# Patient Record
Sex: MALE | URBAN - METROPOLITAN AREA
[De-identification: names, ages, dates, MRNs, and addresses within clinical notes are randomized per-mention and may not be internally consistent; named-entity substitution may affect disease eponyms.]

---

## 2019-02-11 ENCOUNTER — APPOINTMENT (EMERGENCY)
Dept: RADIOLOGY | Facility: HOSPITAL | Age: 84
DRG: 854 | End: 2019-02-11
Payer: COMMERCIAL

## 2019-02-11 ENCOUNTER — HOSPITAL ENCOUNTER (INPATIENT)
Facility: HOSPITAL | Age: 84
LOS: 8 days | Discharge: NURSING, REHAB OR LONG-TERM CARE | DRG: 854 | End: 2019-02-19
Attending: EMERGENCY MEDICINE | Admitting: SURGERY
Payer: COMMERCIAL

## 2019-02-11 DIAGNOSIS — E80.6 HYPERBILIRUBINEMIA: ICD-10-CM

## 2019-02-11 DIAGNOSIS — R78.81 BACTEREMIA: ICD-10-CM

## 2019-02-11 DIAGNOSIS — K83.09 CHOLANGITIS: Primary | ICD-10-CM

## 2019-02-11 DIAGNOSIS — F02.80 DEMENTIA ASSOCIATED WITH OTHER UNDERLYING DISEASE WITHOUT BEHAVIORAL DISTURBANCE (HCC): ICD-10-CM

## 2019-02-11 DIAGNOSIS — B35.1 ONYCHOMYCOSIS: ICD-10-CM

## 2019-02-11 DIAGNOSIS — R10.11 RUQ PAIN: ICD-10-CM

## 2019-02-11 DIAGNOSIS — K81.9 CHOLECYSTITIS: ICD-10-CM

## 2019-02-11 DIAGNOSIS — R74.01 TRANSAMINITIS: ICD-10-CM

## 2019-02-11 DIAGNOSIS — R41.82 ALTERED MENTAL STATUS: ICD-10-CM

## 2019-02-11 LAB
ALBUMIN SERPL BCP-MCNC: 3 G/DL (ref 3.5–5)
ALP SERPL-CCNC: 212 U/L (ref 46–116)
ALT SERPL W P-5'-P-CCNC: 132 U/L (ref 12–78)
ANION GAP SERPL CALCULATED.3IONS-SCNC: 9 MMOL/L (ref 4–13)
ANISOCYTOSIS BLD QL SMEAR: PRESENT
APAP SERPL-MCNC: <2 UG/ML (ref 10–30)
APTT PPP: 29 SECONDS (ref 26–38)
AST SERPL W P-5'-P-CCNC: 128 U/L (ref 5–45)
ATRIAL RATE: 79 BPM
BACTERIA UR QL AUTO: ABNORMAL /HPF
BASOPHILS # BLD MANUAL: 0 THOUSAND/UL (ref 0–0.1)
BASOPHILS NFR MAR MANUAL: 0 % (ref 0–1)
BILIRUB SERPL-MCNC: 7.66 MG/DL (ref 0.2–1)
BILIRUB UR QL STRIP: ABNORMAL
BUN SERPL-MCNC: 39 MG/DL (ref 5–25)
CALCIUM SERPL-MCNC: 8.7 MG/DL (ref 8.3–10.1)
CHLORIDE SERPL-SCNC: 102 MMOL/L (ref 100–108)
CLARITY UR: ABNORMAL
CO2 SERPL-SCNC: 22 MMOL/L (ref 21–32)
COLOR UR: ABNORMAL
COLOR, POC: NORMAL
CREAT SERPL-MCNC: 1.17 MG/DL (ref 0.6–1.3)
EOSINOPHIL # BLD MANUAL: 0 THOUSAND/UL (ref 0–0.4)
EOSINOPHIL NFR BLD MANUAL: 0 % (ref 0–6)
ERYTHROCYTE [DISTWIDTH] IN BLOOD BY AUTOMATED COUNT: 16.1 % (ref 11.6–15.1)
GFR SERPL CREATININE-BSD FRML MDRD: 58 ML/MIN/1.73SQ M
GLUCOSE SERPL-MCNC: 118 MG/DL (ref 65–140)
GLUCOSE UR STRIP-MCNC: NEGATIVE MG/DL
HCT VFR BLD AUTO: 35.1 % (ref 36.5–49.3)
HGB BLD-MCNC: 12 G/DL (ref 12–17)
HGB UR QL STRIP.AUTO: ABNORMAL
HYALINE CASTS #/AREA URNS LPF: ABNORMAL /LPF
INR PPP: 1.15 (ref 0.86–1.17)
KETONES UR STRIP-MCNC: ABNORMAL MG/DL
LACTATE SERPL-SCNC: 1.5 MMOL/L (ref 0.5–2)
LACTATE SERPL-SCNC: 3.1 MMOL/L (ref 0.5–2)
LEUKOCYTE ESTERASE UR QL STRIP: NEGATIVE
LIPASE SERPL-CCNC: 28 U/L (ref 73–393)
LYMPHOCYTES # BLD AUTO: 0.29 THOUSAND/UL (ref 0.6–4.47)
LYMPHOCYTES # BLD AUTO: 2 % (ref 14–44)
MCH RBC QN AUTO: 38.7 PG (ref 26.8–34.3)
MCHC RBC AUTO-ENTMCNC: 34.2 G/DL (ref 31.4–37.4)
MCV RBC AUTO: 113 FL (ref 82–98)
MONOCYTES # BLD AUTO: 0.57 THOUSAND/UL (ref 0–1.22)
MONOCYTES NFR BLD: 4 % (ref 4–12)
NEUTROPHILS # BLD MANUAL: 13.34 THOUSAND/UL (ref 1.85–7.62)
NEUTS BAND NFR BLD MANUAL: 2 % (ref 0–8)
NEUTS SEG NFR BLD AUTO: 91 % (ref 43–75)
NITRITE UR QL STRIP: NEGATIVE
NON-SQ EPI CELLS URNS QL MICRO: ABNORMAL /HPF
NRBC BLD AUTO-RTO: 0 /100 WBCS
P AXIS: 88 DEGREES
PH UR STRIP.AUTO: 5.5 [PH] (ref 4.5–8)
PLATELET # BLD AUTO: 156 THOUSANDS/UL (ref 149–390)
PLATELET BLD QL SMEAR: ABNORMAL
PMV BLD AUTO: 10.4 FL (ref 8.9–12.7)
POIKILOCYTOSIS BLD QL SMEAR: PRESENT
POLYCHROMASIA BLD QL SMEAR: PRESENT
POTASSIUM SERPL-SCNC: 5.2 MMOL/L (ref 3.5–5.3)
PR INTERVAL: 194 MS
PROT SERPL-MCNC: 7.3 G/DL (ref 6.4–8.2)
PROT UR STRIP-MCNC: ABNORMAL MG/DL
PROTHROMBIN TIME: 14.8 SECONDS (ref 11.8–14.2)
QRS AXIS: -65 DEGREES
QRSD INTERVAL: 102 MS
QT INTERVAL: 360 MS
QTC INTERVAL: 412 MS
RBC # BLD AUTO: 3.1 MILLION/UL (ref 3.88–5.62)
RBC #/AREA URNS AUTO: ABNORMAL /HPF
RBC MORPH BLD: PRESENT
SODIUM SERPL-SCNC: 133 MMOL/L (ref 136–145)
SP GR UR STRIP.AUTO: >=1.03 (ref 1–1.03)
T WAVE AXIS: 63 DEGREES
TROPONIN I SERPL-MCNC: <0.02 NG/ML
TSH SERPL DL<=0.05 MIU/L-ACNC: 0.92 UIU/ML (ref 0.36–3.74)
UROBILINOGEN UR QL STRIP.AUTO: 4 E.U./DL
VARIANT LYMPHS # BLD AUTO: 1 %
VENTRICULAR RATE: 79 BPM
WBC # BLD AUTO: 14.34 THOUSAND/UL (ref 4.31–10.16)
WBC #/AREA URNS AUTO: ABNORMAL /HPF

## 2019-02-11 PROCEDURE — 85610 PROTHROMBIN TIME: CPT | Performed by: EMERGENCY MEDICINE

## 2019-02-11 PROCEDURE — 36415 COLL VENOUS BLD VENIPUNCTURE: CPT | Performed by: EMERGENCY MEDICINE

## 2019-02-11 PROCEDURE — 85027 COMPLETE CBC AUTOMATED: CPT | Performed by: EMERGENCY MEDICINE

## 2019-02-11 PROCEDURE — 81002 URINALYSIS NONAUTO W/O SCOPE: CPT | Performed by: EMERGENCY MEDICINE

## 2019-02-11 PROCEDURE — 87040 BLOOD CULTURE FOR BACTERIA: CPT | Performed by: EMERGENCY MEDICINE

## 2019-02-11 PROCEDURE — 71046 X-RAY EXAM CHEST 2 VIEWS: CPT

## 2019-02-11 PROCEDURE — 99285 EMERGENCY DEPT VISIT HI MDM: CPT

## 2019-02-11 PROCEDURE — 85007 BL SMEAR W/DIFF WBC COUNT: CPT | Performed by: EMERGENCY MEDICINE

## 2019-02-11 PROCEDURE — 74177 CT ABD & PELVIS W/CONTRAST: CPT

## 2019-02-11 PROCEDURE — 93005 ELECTROCARDIOGRAM TRACING: CPT

## 2019-02-11 PROCEDURE — 81003 URINALYSIS AUTO W/O SCOPE: CPT

## 2019-02-11 PROCEDURE — 87077 CULTURE AEROBIC IDENTIFY: CPT | Performed by: EMERGENCY MEDICINE

## 2019-02-11 PROCEDURE — 99221 1ST HOSP IP/OBS SF/LOW 40: CPT | Performed by: SURGERY

## 2019-02-11 PROCEDURE — 70450 CT HEAD/BRAIN W/O DYE: CPT

## 2019-02-11 PROCEDURE — 87186 SC STD MICRODIL/AGAR DIL: CPT | Performed by: EMERGENCY MEDICINE

## 2019-02-11 PROCEDURE — 80053 COMPREHEN METABOLIC PANEL: CPT | Performed by: EMERGENCY MEDICINE

## 2019-02-11 PROCEDURE — 83690 ASSAY OF LIPASE: CPT | Performed by: EMERGENCY MEDICINE

## 2019-02-11 PROCEDURE — 76705 ECHO EXAM OF ABDOMEN: CPT

## 2019-02-11 PROCEDURE — 85730 THROMBOPLASTIN TIME PARTIAL: CPT | Performed by: EMERGENCY MEDICINE

## 2019-02-11 PROCEDURE — 81001 URINALYSIS AUTO W/SCOPE: CPT

## 2019-02-11 PROCEDURE — 83605 ASSAY OF LACTIC ACID: CPT | Performed by: EMERGENCY MEDICINE

## 2019-02-11 PROCEDURE — 84484 ASSAY OF TROPONIN QUANT: CPT | Performed by: EMERGENCY MEDICINE

## 2019-02-11 PROCEDURE — 84443 ASSAY THYROID STIM HORMONE: CPT | Performed by: EMERGENCY MEDICINE

## 2019-02-11 PROCEDURE — 80329 ANALGESICS NON-OPIOID 1 OR 2: CPT | Performed by: EMERGENCY MEDICINE

## 2019-02-11 PROCEDURE — 93010 ELECTROCARDIOGRAM REPORT: CPT | Performed by: INTERNAL MEDICINE

## 2019-02-11 PROCEDURE — 96365 THER/PROPH/DIAG IV INF INIT: CPT

## 2019-02-11 PROCEDURE — 87076 CULTURE ANAEROBE IDENT EACH: CPT | Performed by: EMERGENCY MEDICINE

## 2019-02-11 PROCEDURE — 96361 HYDRATE IV INFUSION ADD-ON: CPT

## 2019-02-11 RX ORDER — HEPARIN SODIUM 5000 [USP'U]/ML
5000 INJECTION, SOLUTION INTRAVENOUS; SUBCUTANEOUS EVERY 8 HOURS SCHEDULED
Status: DISCONTINUED | OUTPATIENT
Start: 2019-02-11 | End: 2019-02-19 | Stop reason: HOSPADM

## 2019-02-11 RX ORDER — CHLORHEXIDINE GLUCONATE 0.12 MG/ML
15 RINSE ORAL EVERY 12 HOURS SCHEDULED
Status: DISCONTINUED | OUTPATIENT
Start: 2019-02-11 | End: 2019-02-12

## 2019-02-11 RX ORDER — SODIUM CHLORIDE 9 MG/ML
100 INJECTION, SOLUTION INTRAVENOUS CONTINUOUS
Status: DISCONTINUED | OUTPATIENT
Start: 2019-02-11 | End: 2019-02-12

## 2019-02-11 RX ADMIN — IOHEXOL 100 ML: 350 INJECTION, SOLUTION INTRAVENOUS at 17:45

## 2019-02-11 RX ADMIN — PIPERACILLIN SODIUM,TAZOBACTAM SODIUM 3.38 G: 3; .375 INJECTION, POWDER, FOR SOLUTION INTRAVENOUS at 17:47

## 2019-02-11 RX ADMIN — SODIUM CHLORIDE 1000 ML: 0.9 INJECTION, SOLUTION INTRAVENOUS at 17:46

## 2019-02-11 RX ADMIN — SODIUM CHLORIDE 1000 ML: 0.9 INJECTION, SOLUTION INTRAVENOUS at 16:12

## 2019-02-11 NOTE — ED PROVIDER NOTES
History  Chief Complaint   Patient presents with    Altered Mental Status     pt comes from home, pt acknowledges his confusion  Patient has some medical care through [facility]  Last visit just over a month ago  Per the note the patient was poorly kept  They also note they were concerned the patient has dementia and were concerned about his living conditions  A 3rd party unknown to us called 911 states the patient's living conditions were unsanitary; police arrived apparently there were bed bugs in-house; the patient was removed from the house  Apparently patient has been declining pretty rapidly  The patient does live on his own although unsure how; because right now he is not oriented  The patient is oriented to himself and distant memories; however thinks it is 18 and is unaware of what month it is  He cannot truly tell me what he does on daily basis; besides goes to [employer]  He appears unkept he is he has stool in his underwear and shoes  Patient is very jaundiced he states people have been saying that he has been jaundiced lately  Patient has no focal findings with cranial nerves or motor exam   He denies any medical complaints  He has no obvious signs of trauma or abdominal discomfort  However patient does not look like he is taking care of himself  Patient is borderline febrile to 100 3° rectally  Patient also has a heart rate of 91 meeting SIRS criteria  Patient is jaundiced throughout in the sclerae and skin  Patient claims he has a brother  None       No past medical history on file  No past surgical history on file  No family history on file  I have reviewed and agree with the history as documented      Social History     Tobacco Use    Smoking status: Not on file   Substance Use Topics    Alcohol use: Not on file    Drug use: Not on file        Review of Systems   Unable to perform ROS: Mental status change (Patient denies all symptoms asked of him although unreliable)   Constitutional: Negative for activity change, appetite change, chills and fever  HENT: Negative for congestion, rhinorrhea and sore throat  Eyes: Negative for photophobia and pain  Respiratory: Negative for cough, chest tightness and wheezing  Cardiovascular: Negative for chest pain, palpitations and leg swelling  Gastrointestinal: Negative for abdominal distention, abdominal pain, constipation, diarrhea and nausea  Genitourinary: Negative for dysuria, flank pain, frequency and hematuria  Musculoskeletal: Negative for arthralgias, back pain, myalgias, neck pain and neck stiffness  Skin: Negative for color change and rash  Neurological: Negative for seizures, speech difficulty, weakness, light-headedness and headaches  Hematological: Negative for adenopathy  Psychiatric/Behavioral: Negative for agitation, confusion, hallucinations and suicidal ideas  Physical Exam  ED Triage Vitals [02/11/19 1559]   Temperature Pulse Respirations Blood Pressure SpO2   100 3 °F (37 9 °C) 91 20 140/74 97 %      Temp Source Heart Rate Source Patient Position - Orthostatic VS BP Location FiO2 (%)   Rectal Monitor Lying Right arm --      Pain Score       No Pain           Orthostatic Vital Signs  Vitals:    02/11/19 1559 02/11/19 1759 02/11/19 2030 02/11/19 2245   BP: 140/74 141/64 140/69 137/72   Pulse: 91 86 78 72   Patient Position - Orthostatic VS: Lying Lying         Physical Exam   Constitutional: He appears well-developed  No distress  Unkept  Stool in underwear  Stool on shoes  Long toenails   HENT:   Head: Normocephalic and atraumatic  Mouth/Throat: No oropharyngeal exudate  Eyes: Pupils are equal, round, and reactive to light  EOM are normal  Right eye exhibits no discharge  Left eye exhibits no discharge  Scleral icterus   Neck: Normal range of motion  Neck supple  No JVD present  No tracheal deviation present  Cardiovascular: Normal rate and normal heart sounds  No murmur heard  Pulmonary/Chest: Effort normal and breath sounds normal  No respiratory distress  He has no wheezes  He has no rales  Clear bilateral   Abdominal: Soft  Bowel sounds are normal  He exhibits no distension  There is tenderness  There is no rebound and no guarding  Very mild upper abdominal discomfort   Musculoskeletal: Normal range of motion  He exhibits no edema or deformity  No swelling upper or lower extremities   Lymphadenopathy:     He has no cervical adenopathy  Neurological: He is alert  He has normal strength  He is disoriented  No cranial nerve deficit  He exhibits normal muscle tone  Alert but not oriented  Cranial nerves intact  Motor 5/5 and symmetrical   Skin: Skin is warm and dry  Psychiatric: Thought content normal        ED Medications  Medications   sodium chloride 0 9 % infusion (has no administration in time range)   chlorhexidine (PERIDEX) 0 12 % oral rinse 15 mL (has no administration in time range)   heparin (porcine) subcutaneous injection 5,000 Units (has no administration in time range)   sodium chloride 0 9 % bolus 1,000 mL (0 mL Intravenous Stopped 2/11/19 1746)   piperacillin-tazobactam (ZOSYN) 3 375 g in sodium chloride 0 9 % 50 mL IVPB (0 g Intravenous Stopped 2/11/19 1820)   sodium chloride 0 9 % bolus 1,000 mL (0 mL Intravenous Stopped 2/11/19 2037)   iohexol (OMNIPAQUE) 350 MG/ML injection (MULTI-DOSE) 100 mL (100 mL Intravenous Given 2/11/19 1745)       Diagnostic Studies  Results Reviewed     Procedure Component Value Units Date/Time    Lactic acid, plasma [[id number]]  (Normal) Collected:  02/11/19 1910    Lab Status:  Final result Specimen:  Blood from Arm, Left Updated:  02/11/19 1957     LACTIC ACID 1 5 mmol/L     Narrative:       Result may be elevated if tourniquet was used during collection      Protime-INR [[id number]]  (Abnormal) Collected:  02/11/19 1753    Lab Status:  Final result Specimen:  Blood from Arm, Left Updated:  02/11/19 1832 Protime 14 8 seconds      INR 1 15    APTT [[id number]]  (Normal) Collected:  02/11/19 1753    Lab Status:  Final result Specimen:  Blood from Arm, Left Updated:  02/11/19 1832     PTT 29 seconds     Comprehensive metabolic panel [[id number]]  (Abnormal) Collected:  02/11/19 1604    Lab Status:  Final result Specimen:  Blood from Arm, Left Updated:  02/11/19 1711     Sodium 133 mmol/L      Potassium 5 2 mmol/L      Chloride 102 mmol/L      CO2 22 mmol/L      ANION GAP 9 mmol/L      BUN 39 mg/dL      Creatinine 1 17 mg/dL      Glucose 118 mg/dL      Calcium 8 7 mg/dL       U/L       U/L      Alkaline Phosphatase 212 U/L      Total Protein 7 3 g/dL      Albumin 3 0 g/dL      Total Bilirubin 7 66 mg/dL      eGFR 58 ml/min/1 73sq m     Narrative:       National Kidney Disease Education Program recommendations are as follows:  GFR calculation is accurate only with a steady state creatinine  Chronic Kidney disease less than 60 ml/min/1 73 sq  meters  Kidney failure less than 15 ml/min/1 73 sq  meters  Lipase [[id number]]  (Abnormal) Collected:  02/11/19 1604    Lab Status:  Final result Specimen:  Blood from Arm, Left Updated:  02/11/19 1711     Lipase 28 u/L     TSH, 3rd generation with Free T4 reflex [[id number]]  (Normal) Collected:  02/11/19 1604    Lab Status:  Final result Specimen:  Blood from Arm, Left Updated:  02/11/19 1711     TSH 3RD GENERATON 0 916 uIU/mL     Narrative:       Patients undergoing fluorescein dye angiography may retain small amounts of fluorescein in the body for 48-72 hours post procedure  Samples containing fluorescein can produce falsely depressed TSH values  If the patient had this procedure,a specimen should be resubmitted post fluorescein clearance      Acetaminophen level [[id number]]  (Abnormal) Collected:  02/11/19 1604    Lab Status:  Final result Specimen:  Blood from Arm, Left Updated:  02/11/19 1711     Acetaminophen Level <2 ug/mL     CBC and differential [[id number]] (Abnormal) Collected:  02/11/19 1604    Lab Status:  Final result Specimen:  Blood from Arm, Left Updated:  02/11/19 1658     WBC 14 34 Thousand/uL      RBC 3 10 Million/uL      Hemoglobin 12 0 g/dL      Hematocrit 35 1 %       fL      MCH 38 7 pg      MCHC 34 2 g/dL      RDW 16 1 %      MPV 10 4 fL      Platelets 287 Thousands/uL      nRBC 0 /100 WBCs     Narrative: This is an appended report  These results have been appended to a previously verified report  Lactic acid, plasma [[id number]]  (Abnormal) Collected:  02/11/19 1604    Lab Status:  Final result Specimen:  Blood from Arm, Left Updated:  02/11/19 1657     LACTIC ACID 3 1 mmol/L     Narrative:       Result may be elevated if tourniquet was used during collection  Troponin I [[id number]]  (Normal) Collected:  02/11/19 1612    Lab Status:  Final result Specimen:  Blood from Arm, Left Updated:  02/11/19 1650     Troponin I <0 02 ng/mL     Urine Microscopic [[id number]]  (Abnormal) Collected:  02/11/19 1619    Lab Status:  Final result Specimen:  Urine, Other Updated:  02/11/19 1636     RBC, UA 4-10 /hpf      WBC, UA 2-4 /hpf      Epithelial Cells None Seen /hpf      Bacteria, UA None Seen /hpf      Hyaline Casts, UA 10-25 /lpf     Blood culture #2 [[id number]] Collected:  02/11/19 1612    Lab Status: In process Specimen:  Blood from Arm, Left Updated:  02/11/19 1622    Blood culture #1 [[id number]] Collected:  02/11/19 1604    Lab Status:   In process Specimen:  Blood from Arm, Left Updated:  02/11/19 1621    POCT urinalysis dipstick [[id number]]  (Normal) Resulted:  02/11/19 1616    Lab Status:  Final result Updated:  02/11/19 1616     Color, UA -    ED Urine Macroscopic [[id number]]  (Abnormal) Collected:  02/11/19 1619    Lab Status:  Final result Specimen:  Urine Updated:  02/11/19 1616     Color, UA Brown     Clarity, UA Cloudy     pH, UA 5 5     Leukocytes, UA Negative     Nitrite, UA Negative     Protein,  (2+) mg/dl      Glucose, UA Negative mg/dl      Ketones, UA Trace mg/dl      Urobilinogen, UA 4 0 E U /dl      Bilirubin, UA Interference- unable to analyze     Blood, UA Moderate     Specific Gravity, UA >=1 030    Narrative:       CLINITEK RESULT                 US right upper quadrant   Final Result by Sherry Martin MD (02/11 2033)      2 2 cm gallbladder neck gallstone with gallbladder wall thickening and pericholecystic fluid, correlate for acute cholecystitis  9 mm common bile duct  Workstation performed: [id number]         CT head without contrast   Final Result by Clarence Wallace MD (02/11 1750)      No acute intracranial abnormality  Mild chronic small vessel ischemic changes and volume loss  Workstation performed: [id number]         CT abdomen pelvis with contrast   Final Result by Clarence Wallace MD (02/11 1807)      Cholelithiasis with a distended gallbladder and mild diffuse wall thickening  The findings are suspicious for acute cholecystitis  Correlation with laboratory studies is recommended  A right upper quadrant ultrasound could be performed for further    evaluation  No free air or free fluid  Workstation performed: [id number]         XR chest 2 views   ED Interpretation by Kimberly Saldaña DO (02/11 1752)   Abnormal   RLL infiltrate       Final Result by Padmini Garduno MD (02/11 2241)      Minimal right base atelectasis  Workstation performed: [id number]               Procedures  Procedures      Phone Consults  ED Phone Contact    ED Course  ED Course as of Feb 12 0033 Mon Feb 11, 2019   1640 WBC(!): 14 34   1643 EKG interpretation shows sinus rhythm at a rate of 79 with sinus arrhythmia  Normal axis    Nonspecific T-wave change in aVL no prior      1652 WBC(!): 14 34   1652 Temperature: 100 3 °F (37 9 °C)   1652 Pulse: 91   1659 LACTIC ACID(!!): 3 1   1712 AST(!): 128   1712 ALT(!): 132   1712 Alkaline Phosphatase(!): 212   1712 TOTAL BILIRUBIN(!): 7 66   1712 Cholestatic labs      1825 Called surgery      2004 LACTIC ACID: 1 5                   Initial Sepsis Screening     Row Name 02/11/19 1711                Is the patient's history suggestive of a new or worsening infection? Yes (Proceed)  (Abnormal)   -DS        Suspected source of infection  acute abdominal infection;suspect infection, source unknown  -DS        Are two or more of the following signs & symptoms of infection both present and new to the patient? Yes (Proceed)  (Abnormal)   -DS        Indicate SIRS criteria  Altered mental status; Tachycardia > 90 bpm;Leukocytosis (WBC > 83650 IJL)  -DS        If the answer is yes to both questions, suspicion of sepsis is present          If severe sepsis is present AND tissue hypoperfusion perists in the hour after fluid resuscitation or lactate > 4, the patient meets criteria for SEPTIC SHOCK          Are any of the following organ dysfunction criteria present within 6 hours of suspected infection and SIRS criteria that are NOT considered to be chronic conditions? Yes  (Abnormal)   -DS        Organ dysfunction  Lactate > 2 0 mmol/L  -DS        Date of presentation of severe sepsis  02/11/19  -DS        Time of presentation of severe sepsis  1700  -DS        Tissue hypoperfusion persists in the hour after crystalloid fluid administration, evidenced, by either:          Was hypotension present within one hour of the conclusion of crystalloid fluid administration?           Date of presentation of septic shock          Time of presentation of septic shock            User Key  (r) = Recorded By, (t) = Taken By, (c) = Cosigned By    [address] Name Provider Type    DEVONTE Bernard DO Physician           Default Flowsheet Data (last 720 hours)      Sepsis Reassess     Row Name 02/11/19 2004                   Repeat Volume Status and Tissue Perfusion Assessment Performed    Repeat Volume Status and Tissue Perfusion Assessment Performed             Volume Status and Tissue Perfusion Post Fluid Resuscitation * Must Document All *    Vital Signs Reviewed (HR, RR, BP, T)  Yes  -DS        Shock Index Reviewed          Arterial Oxygen Saturation Reviewed (POx, SaO2 or SpO2)          Cardio  Normal S1/S2  -DS        Pulmonary  Normal effort  -DS        Capillary Refill  Brisk  -DS        Peripheral Pulses  Radial;Dorsalis Pedis  -DS        Peripheral Pulse  +2  -DS        Dorsalis Pedis  +2  -DS        Skin  Warm;Dry  -DS        Urine output assessed  Adequate  -DS           *OR*   Intensive Monitoring- Must Document One of the Following Four *:    Vital Signs Reviewed          * Central Venous Pressure (CVP or RAP)          * Central Venous Oxygen (SVO2, ScvO2 or Oxygen saturation via central catheter)          * Bedside Cardiovascular US in IVC diameter and % collapse          * Passive Leg Raise OR Crystalloid Challenge            User Key  (r) = Recorded By, (t) = Taken By, (c) = Cosigned By    Initials Name Provider Type    DS Londell Octave, DO Physician                MDM  Number of Diagnoses or Management Options  Altered mental status:   Cholecystitis:   Hyperbilirubinemia:   RUQ pain:   Transaminitis:   Diagnosis management comments: Confusion of unknown baseline  Meets SIRS criteria elevated lactic acid meeting criteria for severe sepsis  Concern for cholecystitis/transaminitis/hyperbilirubinemia  In the setting of sepsis concern for ascending cholangitis  Blood cultures drawn Zosyn was given for antibiotics  Lactic acid corrected  Vital signs improved  Discussed with surgery and admitted        Disposition  Final diagnoses:   RUQ pain   Cholecystitis   Altered mental status   Hyperbilirubinemia   Transaminitis     Time reflects when diagnosis was documented in both MDM as applicable and the Disposition within this note     Time User Action Codes Description Comment    2/11/2019  6:24 PM Ramon Half Add [R10 11] RUQ pain     2/11/2019  8:06 PM [place] Half Add [K81 9] Cholecystitis     2/11/2019  8:06 PM Sable Ode Add [R41 82] Altered mental status     2/11/2019  8:06 PM Sable Ode Add [E80 6] Hyperbilirubinemia     2/11/2019  8:06 PM Sable Ode Add [R74 0] Transaminitis     2/11/2019 10:43 PM Alpa Coopertner Add [K83 09] Cholangitis       ED Disposition     ED Disposition Condition Date/Time Comment    Admit Stable Mon Feb 11, 2019  8:05 PM Case was discussed with Surgery and the patient's admission status was agreed to be Admission Status: inpatient status to the service of Dr Zoya Tellez   Follow-up Information    None         There are no discharge medications for this patient  No discharge procedures on file  ED Provider  Attending physically available and evaluated Bowen Jaquez I managed the patient along with the ED Attending      Electronically Signed by         Hardeep Sahu DO  02/12/19 5778

## 2019-02-11 NOTE — ED ATTENDING ATTESTATION
Jaycob England DO, saw and evaluated the patient  I have discussed the patient with the resident/non-physician practitioner and agree with the resident's/non-physician practitioner's findings, Plan of Care, and MDM as documented in the resident's/non-physician practitioner's note, except where noted  All available labs and Radiology studies were reviewed  At this point I agree with the current assessment done in the Emergency Department  I have conducted an independent evaluation of this patient a history and physical is as follows:    81 yom from home arrives by EMS with confusion  A woman named Rizwana Spencer from [employer] called the ED to say that he has been becoming more and more yellow and confused and then was not seen for two weeks  He arrives confused and covered in feces  /74 (BP Location: Right arm)   Pulse 91   Temp 100 3 °F (37 9 °C) (Rectal)   Resp 20   SpO2 97%   A&O to person, impaired recent memory  MM dry  CTA  RRR  Ab soft, NT  Ext NROM  Jaundiced     CT head, CT AP, labs +urine, zosyn for potential biliary tract infection  DDx broad  EKG with PVCs and sinus arrhythmia without ST segment elevations or obvious depressions  Chest x-ray with evidence of right lower lobe pneumonia    Patient is being treated broadly with Zosyn for potential hepatobiliary infection along with the coverage for pneumonia, for which aspiration is likely        Dx  Liver failure  Encephalopathy   Fever  Sepsis   RLL PNA        Critical Care Time  CriticalCare Time  Performed by: Giana Mcintyre DO  Authorized by: Giana Mcintyre DO     Critical care provider statement:     Critical care time (minutes):  50    Critical care time was exclusive of:  Separately billable procedures and treating other patients and teaching time    Critical care was necessary to treat or prevent imminent or life-threatening deterioration of the following conditions:  Sepsis, hepatic failure and CNS failure or compromise Critical care was time spent personally by me on the following activities:  Obtaining history from patient or surrogate, development of treatment plan with patient or surrogate, evaluation of patient's response to treatment, examination of patient, review of old charts, re-evaluation of patient's condition, ordering and review of radiographic studies, ordering and review of laboratory studies and ordering and performing treatments and interventions

## 2019-02-11 NOTE — SEPSIS NOTE
Sepsis Note   Devika Desai 81 y o  male MRN: [id number]  Unit/Bed#: ED 13 Encounter: [id number]      Initial Sepsis Screening     Row Name 02/11/19 1711                Is the patient's history suggestive of a new or worsening infection? Yes (Proceed)  (Abnormal)   -DS        Suspected source of infection  acute abdominal infection;suspect infection, source unknown  -DS        Are two or more of the following signs & symptoms of infection both present and new to the patient? Yes (Proceed)  (Abnormal)   -DS        Indicate SIRS criteria  Altered mental status; Tachycardia > 90 bpm;Leukocytosis (WBC > 10614 IJL)  -DS        If the answer is yes to both questions, suspicion of sepsis is present          If severe sepsis is present AND tissue hypoperfusion perists in the hour after fluid resuscitation or lactate > 4, the patient meets criteria for SEPTIC SHOCK          Are any of the following organ dysfunction criteria present within 6 hours of suspected infection and SIRS criteria that are NOT considered to be chronic conditions? Yes  (Abnormal)   -DS        Organ dysfunction  Lactate > 2 0 mmol/L  -DS        Date of presentation of severe sepsis  02/11/19  -DS        Time of presentation of severe sepsis  1700  -DS        Tissue hypoperfusion persists in the hour after crystalloid fluid administration, evidenced, by either:          Was hypotension present within one hour of the conclusion of crystalloid fluid administration?           Date of presentation of septic shock          Time of presentation of septic shock            User Key  (r) = Recorded By, (t) = Taken By, (c) = Cosigned By    Initials Name Provider Type    DS Raf Murillo DO Physician

## 2019-02-12 ENCOUNTER — APPOINTMENT (INPATIENT)
Dept: RADIOLOGY | Facility: HOSPITAL | Age: 84
DRG: 854 | End: 2019-02-12
Attending: INTERNAL MEDICINE
Payer: COMMERCIAL

## 2019-02-12 ENCOUNTER — ANESTHESIA EVENT (INPATIENT)
Dept: GASTROENTEROLOGY | Facility: HOSPITAL | Age: 84
DRG: 854 | End: 2019-02-12
Payer: COMMERCIAL

## 2019-02-12 ENCOUNTER — ANESTHESIA (INPATIENT)
Dept: GASTROENTEROLOGY | Facility: HOSPITAL | Age: 84
DRG: 854 | End: 2019-02-12
Payer: COMMERCIAL

## 2019-02-12 PROBLEM — R10.11 RUQ PAIN: Status: ACTIVE | Noted: 2019-02-12

## 2019-02-12 PROBLEM — E80.6 HYPERBILIRUBINEMIA: Status: ACTIVE | Noted: 2019-02-12

## 2019-02-12 PROBLEM — R74.01 TRANSAMINITIS: Status: ACTIVE | Noted: 2019-02-12

## 2019-02-12 PROBLEM — K81.9 CHOLECYSTITIS: Status: ACTIVE | Noted: 2019-02-12

## 2019-02-12 PROBLEM — A41.9 SEVERE SEPSIS (HCC): Status: ACTIVE | Noted: 2019-02-12

## 2019-02-12 PROBLEM — K83.09 CHOLANGITIS: Status: ACTIVE | Noted: 2019-02-12

## 2019-02-12 PROBLEM — R65.20 SEVERE SEPSIS (HCC): Status: ACTIVE | Noted: 2019-02-12

## 2019-02-12 PROBLEM — R41.82 ALTERED MENTAL STATUS: Status: ACTIVE | Noted: 2019-02-12

## 2019-02-12 LAB
ALBUMIN SERPL BCP-MCNC: 2.9 G/DL (ref 3.5–5)
ALP SERPL-CCNC: 209 U/L (ref 46–116)
ALT SERPL W P-5'-P-CCNC: 116 U/L (ref 12–78)
ANION GAP SERPL CALCULATED.3IONS-SCNC: 6 MMOL/L (ref 4–13)
ANISOCYTOSIS BLD QL SMEAR: PRESENT
AST SERPL W P-5'-P-CCNC: 81 U/L (ref 5–45)
BASOPHILS # BLD MANUAL: 0 THOUSAND/UL (ref 0–0.1)
BASOPHILS NFR MAR MANUAL: 0 % (ref 0–1)
BILIRUB SERPL-MCNC: 5.42 MG/DL (ref 0.2–1)
BUN SERPL-MCNC: 22 MG/DL (ref 5–25)
CA-I BLD-SCNC: 1.08 MMOL/L (ref 1.12–1.32)
CALCIUM SERPL-MCNC: 8.4 MG/DL (ref 8.3–10.1)
CHLORIDE SERPL-SCNC: 107 MMOL/L (ref 100–108)
CO2 SERPL-SCNC: 26 MMOL/L (ref 21–32)
CREAT SERPL-MCNC: 0.65 MG/DL (ref 0.6–1.3)
EOSINOPHIL # BLD MANUAL: 0.24 THOUSAND/UL (ref 0–0.4)
EOSINOPHIL NFR BLD MANUAL: 2 % (ref 0–6)
ERYTHROCYTE [DISTWIDTH] IN BLOOD BY AUTOMATED COUNT: 16.4 % (ref 11.6–15.1)
GFR SERPL CREATININE-BSD FRML MDRD: 91 ML/MIN/1.73SQ M
GIANT PLATELETS BLD QL SMEAR: PRESENT
GLUCOSE SERPL-MCNC: 101 MG/DL (ref 65–140)
GLUCOSE SERPL-MCNC: 93 MG/DL (ref 65–140)
GLUCOSE SERPL-MCNC: 98 MG/DL (ref 65–140)
HCT VFR BLD AUTO: 37.6 % (ref 36.5–49.3)
HGB BLD-MCNC: 12.6 G/DL (ref 12–17)
INR PPP: 1.15 (ref 0.86–1.17)
LYMPHOCYTES # BLD AUTO: 0.48 THOUSAND/UL (ref 0.6–4.47)
LYMPHOCYTES # BLD AUTO: 4 % (ref 14–44)
MACROCYTES BLD QL AUTO: PRESENT
MAGNESIUM SERPL-MCNC: 2.4 MG/DL (ref 1.6–2.6)
MCH RBC QN AUTO: 38.3 PG (ref 26.8–34.3)
MCHC RBC AUTO-ENTMCNC: 33.5 G/DL (ref 31.4–37.4)
MCV RBC AUTO: 114 FL (ref 82–98)
MONOCYTES # BLD AUTO: 0.84 THOUSAND/UL (ref 0–1.22)
MONOCYTES NFR BLD: 7 % (ref 4–12)
NEUTROPHILS # BLD MANUAL: 10.46 THOUSAND/UL (ref 1.85–7.62)
NEUTS SEG NFR BLD AUTO: 87 % (ref 43–75)
NRBC BLD AUTO-RTO: 0 /100 WBCS
PHOSPHATE SERPL-MCNC: 2.1 MG/DL (ref 2.3–4.1)
PLATELET # BLD AUTO: 183 THOUSANDS/UL (ref 149–390)
PLATELET # BLD AUTO: 188 THOUSANDS/UL (ref 149–390)
PLATELET BLD QL SMEAR: ADEQUATE
PMV BLD AUTO: 10.6 FL (ref 8.9–12.7)
PMV BLD AUTO: 10.9 FL (ref 8.9–12.7)
POTASSIUM SERPL-SCNC: 3.6 MMOL/L (ref 3.5–5.3)
PROT SERPL-MCNC: 7.4 G/DL (ref 6.4–8.2)
PROTHROMBIN TIME: 14.8 SECONDS (ref 11.8–14.2)
RBC # BLD AUTO: 3.29 MILLION/UL (ref 3.88–5.62)
RBC MORPH BLD: PRESENT
SODIUM SERPL-SCNC: 139 MMOL/L (ref 136–145)
WBC # BLD AUTO: 12.02 THOUSAND/UL (ref 4.31–10.16)

## 2019-02-12 PROCEDURE — C1769 GUIDE WIRE: HCPCS | Performed by: INTERNAL MEDICINE

## 2019-02-12 PROCEDURE — 88341 IMHCHEM/IMCYTCHM EA ADD ANTB: CPT | Performed by: PATHOLOGY

## 2019-02-12 PROCEDURE — 74328 X-RAY BILE DUCT ENDOSCOPY: CPT

## 2019-02-12 PROCEDURE — 85049 AUTOMATED PLATELET COUNT: CPT | Performed by: EMERGENCY MEDICINE

## 2019-02-12 PROCEDURE — 43273 ENDOSCOPIC PANCREATOSCOPY: CPT | Performed by: INTERNAL MEDICINE

## 2019-02-12 PROCEDURE — 0DB68ZX EXCISION OF STOMACH, VIA NATURAL OR ARTIFICIAL OPENING ENDOSCOPIC, DIAGNOSTIC: ICD-10-PCS | Performed by: INTERNAL MEDICINE

## 2019-02-12 PROCEDURE — 43261 ENDO CHOLANGIOPANCREATOGRAPH: CPT | Performed by: INTERNAL MEDICINE

## 2019-02-12 PROCEDURE — 43262 ENDO CHOLANGIOPANCREATOGRAPH: CPT | Performed by: INTERNAL MEDICINE

## 2019-02-12 PROCEDURE — 88305 TISSUE EXAM BY PATHOLOGIST: CPT | Performed by: PATHOLOGY

## 2019-02-12 PROCEDURE — 83735 ASSAY OF MAGNESIUM: CPT | Performed by: EMERGENCY MEDICINE

## 2019-02-12 PROCEDURE — 88342 IMHCHEM/IMCYTCHM 1ST ANTB: CPT | Performed by: PATHOLOGY

## 2019-02-12 PROCEDURE — 88312 SPECIAL STAINS GROUP 1: CPT | Performed by: PATHOLOGY

## 2019-02-12 PROCEDURE — 85007 BL SMEAR W/DIFF WBC COUNT: CPT | Performed by: EMERGENCY MEDICINE

## 2019-02-12 PROCEDURE — 99232 SBSQ HOSP IP/OBS MODERATE 35: CPT | Performed by: SURGERY

## 2019-02-12 PROCEDURE — 84100 ASSAY OF PHOSPHORUS: CPT | Performed by: EMERGENCY MEDICINE

## 2019-02-12 PROCEDURE — 0FC98ZZ EXTIRPATION OF MATTER FROM COMMON BILE DUCT, VIA NATURAL OR ARTIFICIAL OPENING ENDOSCOPIC: ICD-10-PCS | Performed by: INTERNAL MEDICINE

## 2019-02-12 PROCEDURE — 80053 COMPREHEN METABOLIC PANEL: CPT | Performed by: EMERGENCY MEDICINE

## 2019-02-12 PROCEDURE — 85610 PROTHROMBIN TIME: CPT | Performed by: EMERGENCY MEDICINE

## 2019-02-12 PROCEDURE — 85027 COMPLETE CBC AUTOMATED: CPT | Performed by: EMERGENCY MEDICINE

## 2019-02-12 PROCEDURE — 82948 REAGENT STRIP/BLOOD GLUCOSE: CPT

## 2019-02-12 PROCEDURE — 43264 ERCP REMOVE DUCT CALCULI: CPT | Performed by: INTERNAL MEDICINE

## 2019-02-12 PROCEDURE — 0DB58ZX EXCISION OF ESOPHAGUS, VIA NATURAL OR ARTIFICIAL OPENING ENDOSCOPIC, DIAGNOSTIC: ICD-10-PCS | Performed by: INTERNAL MEDICINE

## 2019-02-12 PROCEDURE — 0DB78ZX EXCISION OF STOMACH, PYLORUS, VIA NATURAL OR ARTIFICIAL OPENING ENDOSCOPIC, DIAGNOSTIC: ICD-10-PCS | Performed by: INTERNAL MEDICINE

## 2019-02-12 PROCEDURE — 82330 ASSAY OF CALCIUM: CPT | Performed by: EMERGENCY MEDICINE

## 2019-02-12 PROCEDURE — 99222 1ST HOSP IP/OBS MODERATE 55: CPT | Performed by: INTERNAL MEDICINE

## 2019-02-12 RX ORDER — SODIUM CHLORIDE, SODIUM GLUCONATE, SODIUM ACETATE, POTASSIUM CHLORIDE, MAGNESIUM CHLORIDE, SODIUM PHOSPHATE, DIBASIC, AND POTASSIUM PHOSPHATE .53; .5; .37; .037; .03; .012; .00082 G/100ML; G/100ML; G/100ML; G/100ML; G/100ML; G/100ML; G/100ML
100 INJECTION, SOLUTION INTRAVENOUS CONTINUOUS
Status: DISCONTINUED | OUTPATIENT
Start: 2019-02-12 | End: 2019-02-14

## 2019-02-12 RX ORDER — HYDROMORPHONE HCL/PF 1 MG/ML
0.2 SYRINGE (ML) INJECTION EVERY 4 HOURS PRN
Status: DISCONTINUED | OUTPATIENT
Start: 2019-02-12 | End: 2019-02-13

## 2019-02-12 RX ORDER — LIDOCAINE HYDROCHLORIDE 10 MG/ML
INJECTION, SOLUTION INFILTRATION; PERINEURAL AS NEEDED
Status: DISCONTINUED | OUTPATIENT
Start: 2019-02-12 | End: 2019-02-12 | Stop reason: SURG

## 2019-02-12 RX ORDER — SUCCINYLCHOLINE CHLORIDE 20 MG/ML
INJECTION INTRAMUSCULAR; INTRAVENOUS AS NEEDED
Status: DISCONTINUED | OUTPATIENT
Start: 2019-02-12 | End: 2019-02-12 | Stop reason: SURG

## 2019-02-12 RX ORDER — PROPOFOL 10 MG/ML
INJECTION, EMULSION INTRAVENOUS AS NEEDED
Status: DISCONTINUED | OUTPATIENT
Start: 2019-02-12 | End: 2019-02-12 | Stop reason: SURG

## 2019-02-12 RX ORDER — ONDANSETRON 2 MG/ML
INJECTION INTRAMUSCULAR; INTRAVENOUS AS NEEDED
Status: DISCONTINUED | OUTPATIENT
Start: 2019-02-12 | End: 2019-02-12 | Stop reason: SURG

## 2019-02-12 RX ORDER — FENTANYL CITRATE 50 UG/ML
INJECTION, SOLUTION INTRAMUSCULAR; INTRAVENOUS AS NEEDED
Status: DISCONTINUED | OUTPATIENT
Start: 2019-02-12 | End: 2019-02-12 | Stop reason: SURG

## 2019-02-12 RX ORDER — POTASSIUM CHLORIDE 14.9 MG/ML
20 INJECTION INTRAVENOUS
Status: COMPLETED | OUTPATIENT
Start: 2019-02-12 | End: 2019-02-12

## 2019-02-12 RX ADMIN — CHLORHEXIDINE GLUCONATE 15 ML: 1.2 RINSE ORAL at 08:56

## 2019-02-12 RX ADMIN — FENTANYL CITRATE 25 MCG: 50 INJECTION, SOLUTION INTRAMUSCULAR; INTRAVENOUS at 14:24

## 2019-02-12 RX ADMIN — FENTANYL CITRATE 50 MCG: 50 INJECTION, SOLUTION INTRAMUSCULAR; INTRAVENOUS at 14:42

## 2019-02-12 RX ADMIN — POTASSIUM CHLORIDE 20 MEQ: 200 INJECTION, SOLUTION INTRAVENOUS at 11:19

## 2019-02-12 RX ADMIN — SODIUM CHLORIDE 125 ML/HR: 0.9 INJECTION, SOLUTION INTRAVENOUS at 01:00

## 2019-02-12 RX ADMIN — CALCIUM GLUCONATE 2 G: 98 INJECTION, SOLUTION INTRAVENOUS at 08:49

## 2019-02-12 RX ADMIN — SODIUM CHLORIDE, SODIUM GLUCONATE, SODIUM ACETATE, POTASSIUM CHLORIDE AND MAGNESIUM CHLORIDE 100 ML/HR: 526; 502; 368; 37; 30 INJECTION, SOLUTION INTRAVENOUS at 09:45

## 2019-02-12 RX ADMIN — PIPERACILLIN SODIUM,TAZOBACTAM SODIUM 4.5 G: 4; .5 INJECTION, POWDER, FOR SOLUTION INTRAVENOUS at 16:27

## 2019-02-12 RX ADMIN — PIPERACILLIN SODIUM,TAZOBACTAM SODIUM 4.5 G: 4; .5 INJECTION, POWDER, FOR SOLUTION INTRAVENOUS at 07:37

## 2019-02-12 RX ADMIN — POTASSIUM PHOSPHATE, MONOBASIC AND POTASSIUM PHOSPHATE, DIBASIC 12 MMOL: 224; 236 INJECTION, SOLUTION INTRAVENOUS at 08:49

## 2019-02-12 RX ADMIN — IOHEXOL 10 ML: 240 INJECTION, SOLUTION INTRATHECAL; INTRAVASCULAR; INTRAVENOUS; ORAL at 15:08

## 2019-02-12 RX ADMIN — PROPOFOL 150 MG: 10 INJECTION, EMULSION INTRAVENOUS at 14:25

## 2019-02-12 RX ADMIN — HEPARIN SODIUM 5000 UNITS: 5000 INJECTION INTRAVENOUS; SUBCUTANEOUS at 01:00

## 2019-02-12 RX ADMIN — POTASSIUM CHLORIDE 20 MEQ: 200 INJECTION, SOLUTION INTRAVENOUS at 12:33

## 2019-02-12 RX ADMIN — SUCCINYLCHOLINE CHLORIDE 100 MG: 20 INJECTION, SOLUTION INTRAMUSCULAR; INTRAVENOUS at 14:26

## 2019-02-12 RX ADMIN — HEPARIN SODIUM 5000 UNITS: 5000 INJECTION INTRAVENOUS; SUBCUTANEOUS at 16:55

## 2019-02-12 RX ADMIN — LIDOCAINE HYDROCHLORIDE 50 MG: 10 INJECTION, SOLUTION INFILTRATION; PERINEURAL at 14:24

## 2019-02-12 RX ADMIN — PIPERACILLIN SODIUM,TAZOBACTAM SODIUM 4.5 G: 4; .5 INJECTION, POWDER, FOR SOLUTION INTRAVENOUS at 20:08

## 2019-02-12 RX ADMIN — HEPARIN SODIUM 5000 UNITS: 5000 INJECTION INTRAVENOUS; SUBCUTANEOUS at 06:17

## 2019-02-12 RX ADMIN — HEPARIN SODIUM 5000 UNITS: 5000 INJECTION INTRAVENOUS; SUBCUTANEOUS at 23:32

## 2019-02-12 RX ADMIN — ONDANSETRON 4 MG: 2 INJECTION INTRAMUSCULAR; INTRAVENOUS at 14:42

## 2019-02-12 RX ADMIN — SODIUM CHLORIDE 125 ML/HR: 0.9 INJECTION, SOLUTION INTRAVENOUS at 08:44

## 2019-02-12 RX ADMIN — INDOMETHACIN 100 MG: 50 SUPPOSITORY RECTAL at 18:01

## 2019-02-12 RX ADMIN — SODIUM CHLORIDE, SODIUM GLUCONATE, SODIUM ACETATE, POTASSIUM CHLORIDE AND MAGNESIUM CHLORIDE 100 ML/HR: 526; 502; 368; 37; 30 INJECTION, SOLUTION INTRAVENOUS at 16:28

## 2019-02-12 NOTE — CONSULTS
Patient is 82yo male with hx of dementia (dx 1/4/19 by [facility] PCP Dr Eboni Barahona) who presents to [facility] after neighbor called 911 concerned about patient's well-being  Patient was found jaundiced, in unsanitary living conditions with bedbugs and covered in feces  Patient was admitted under the surgery team with cholangitis, and sent to ICU  Patient's mental status was noted to have improved upon arrival to ICU  He is generally oriented to self and forgets why or where he is  PCP reported 1/4/19 that he felt patient had "significant dementia" and was concerned about patient's home environment  PCP made referral to [address] [place] [address] on Aging  Patient currently NPO  Elevated liver enzymes  If/when patient no longer NPO and ERCP vs MRCP completed, would recommend considering tylenol 650mg Q8  For prn pain relief at this time with NPO status, would recommend dilaudid 0 2mg IV PRN Q4 for breakthrough pain control/severe pain  Will defer bowel regimen to Surgery/GI services    Recommend restarting patient's home lexapro asap as can cause withdrawal and subsequent confusion when abruptly discontinued  Will perform formal consult tomorrow  Please tiger text with any questions

## 2019-02-12 NOTE — UTILIZATION REVIEW
Initial Clinical Review    Admission: Date/Time/Statement: 2/11/19 @ 2101   Orders Placed This Encounter   Procedures    Inpatient Admission     Standing Status:   Standing     Number of Occurrences:   1     Order Specific Question:   Admitting Physician     Answer:   Da Zuñiga [[id number]]     Order Specific Question:   Level of Care     Answer:   Med Surg [[id number]]     Order Specific Question:   Estimated length of stay     Answer:   More than 2 Midnights     Order Specific Question:   Certification     Answer:   I certify that inpatient services are medically necessary for this patient for a duration of greater than two midnights  See H&P and MD Progress Notes for additional information about the patient's course of treatment  ED: Date/Time/Mode of Arrival:   ED Arrival Information     Expected Arrival Acuity Means of Arrival Escorted By Service Admission Type    - 2/11/2019 15:40 Urgent Ambulance [facility] Critical Care/ICU Urgent    Arrival Complaint    -        Chief Complaint   Patient presents with    Altered Mental Status     pt comes from home, pt acknowledges his confusion  History of Illness    81year old male presents to the ed via police for evaluation of confusion and jaundice  Patient was covered in feces  And the friend who found him had not seen him in 2 weeks  Finding him altered  Police received a call from  Unknown person who requested  Home safety check  Patient was found confused,  Poor living conditions and possible bed bugs      ED Triage Vitals [02/11/19 1559]   100 3 °F (37 9 °C) 91 20 140/74 97 %      No Pain       02/12/19 70 1 kg (154 lb 8 7 oz)       Pertinent Labs/Diagnostic Test Results:     Physical exam:  Scleral icterus present   GCS 14      WBC   14 34   AST  128  ALT  132  ALK PHOS 212   Lipase  28    Total bilirubin   7 66  Urine :  Wbc  2-4   Rbc  4-10   Hyaline casts  10-25  Lactic acid  3 1   Blood culture  Preliminary  X 2  Gram negative rods acetaminaphen  <2    RUQ  US    2 2 cm gallbladder neck gallstone with gallbladder wall thickening and pericholecystic fluid, correlate for acute cholecystitis  9 mm common bile duct  ED Treatment:   Medication Administration from 02/11/2019 1540 to 02/11/2019 2307       Date/Time Order Dose Route     02/11/2019 1612 sodium chloride 0 9 % bolus 1,000 mL 1,000 mL Intravenous     02/11/2019 1747 piperacillin-tazobactam (ZOSYN) 3 375 g in sodium chloride 0 9 % 50 mL IVPB 3 375 g Intravenous     02/11/2019 1746 sodium chloride 0 9 % bolus 1,000 mL 1,000 mL Intravenous            No Additional Past Medical History     Admitting Diagnosis:     Cholangitis [K83 09]  Hyperbilirubinemia [E80 6]  Cholecystitis [K81 9]  Altered mental status [R41 82]  RUQ pain [R10 11]  Transaminitis [R74 0]      Age/Sex: 81 y o  male presents with AMS and severe sepsis 2/2 cholangitis          Assessment/Plan:    Critical care     Encephalopathy most likely 2/2 severe sepsis   Mental status seems improved on arrival to ICU  Neurochecks Q2hr  Pain: no complain of pain at this time, add analgesia as needed     CAM-ICU  Questionable history of dementia   Hx of JAI, depression   -Hold home lexapro         Cholecystitis/Cholangitis   -Large stone in GB neck, dilatation of CBD  -Zosyn initiated   -, , Alk Phos 212  -GI consulted, plan for ERCP, keep NPO   2   Hyperbilirubinemia   - T Bili 7 66  -Continue to trend     Severe sepsis 2/2 cholangitis   -Continue zosyn   -WBC 12 02-> 14 34  -Lactic acid 3 1-> 1 5   -2/11 Blood cultures pending            Admission Orders:    [address] [facility] gerontology  Consult gastroenterology  Consult surgery    Case request operating room: ENDOSCOPIC RETROGRADE CHOLANGIOPANCREATOGRAPHY         Scheduled Meds:     calcium gluconate 2 g Intravenous Once   chlorhexidine 15 mL Swish & Spit Q12H [facility]   heparin (porcine) 5,000 Units Subcutaneous Q8H [facility]   piperacillin-tazobactam 4 5 g Intravenous Q8H potassium chloride 20 mEq Intravenous Q2H   potassium phosphate 12 mmol Intravenous Once   sodium chloride 125 mL/hr Intravenous Continuous

## 2019-02-12 NOTE — CONSULTS
Consultation - [facility]  Giselle Hernandez 81 y o  male MRN: [id number]  Unit/Bed#: 3150 [facility] -01 Encounter: [id number]             Inpatient consult to gastroenterology     Performed by  Sakina Sharma MD     Authorized by Patty Sultana MD              Reason for Consult / Principal Problem:     Obstructive jaundice      ASSESSMENT AND PLAN:      Obstructive jaundice  71-year-old male patient with possible dementia found down at home presenting with cholecystitis  2 2 cm gallstone is at the neck of the gallbladder  He was reported to have confusion and a low-grade fever on admission of 100 3, CBC on admission was 14 down to 12 today  His LFTs are abnormal, total bilirubin on admission was 7 6 down to 5 4 this morning, alk-phos is also slightly trending down from 212 to 209, AST 81 and  down trending from yesterday  There is a possibility that the obstructive jaundice could 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 but given the high T bili, dilated CBD and possible cholangitis will proceed with ERCP  Patient currently on antibiotics  INR 1 15, platelets 504    ______________________________________________________________________    HPI:    71-year-old male patient without significant past medical history except for possible dementia  The patient was found down by his neighbors covered in feces and EMS was called, EMS found the patient to be confused was transferred to the ED to be evaluated, in the ED the patient was found to have leukocytosis, jaundice, low-grade fever and tachycardia meeting SIRS criteria, a CT of the abdomen was performed and was concerning for cholecystitis, ultrasound of the abdomen was also performed and showed a 2 2 cm gallstone in the gallbladder neck with a dilated common bile duct, GI was consulted to evaluate the possibility of ERCP    Currently the patient is weight and alert but disoriented, he is unkempt, denies nausea or vomiting, he has mild abdominal pain, he is passing gases  REVIEW OF SYSTEMS:    CONSTITUTIONAL: Denies any fever, chills, rigors, and weight loss  HEENT: No earache or tinnitus  Denies hearing loss or visual disturbances  CARDIOVASCULAR: No chest pain or palpitations  RESPIRATORY: Denies any cough, hemoptysis, shortness of breath or dyspnea on exertion  GASTROINTESTINAL: As noted in the History of Present Illness  GENITOURINARY: No problems with urination  Denies any hematuria or dysuria  NEUROLOGIC: No dizziness or vertigo, denies headaches  MUSCULOSKELETAL: Denies any muscle or joint pain  SKIN: Denies skin rashes or itching  ENDOCRINE: Denies excessive thirst  Denies intolerance to heat or cold  PSYCHOSOCIAL: Denies depression or anxiety  Denies any recent memory loss  Historical Information   No past medical history on file  No past surgical history on file  Social History   Social History     Substance and Sexual Activity   Alcohol Use Not on file     Social History     Substance and Sexual Activity   Drug Use Not on file     Social History     Tobacco Use   Smoking Status Not on file     No family history on file  Meds/Allergies     No medications prior to admission  Current Facility-Administered Medications   Medication Dose Route Frequency    chlorhexidine (PERIDEX) 0 12 % oral rinse 15 mL  15 mL Swish & Spit Q12H [address]    heparin (porcine) subcutaneous injection 5,000 Units  5,000 Units Subcutaneous Q8H [address]    multi-electrolyte (ISOLYTE-S PH 7 4 equivalent) IV solution  100 mL/hr Intravenous Continuous    piperacillin-tazobactam (ZOSYN) 4 5 g in sodium chloride 0 9 % 100 mL IVPB  4 5 g Intravenous Q6H    potassium chloride 20 mEq IVPB (premix)  20 mEq Intravenous Q2H    potassium phosphate 12 mmol in sodium chloride 0 9 % 250 mL infusion  12 mmol Intravenous Once       No Known Allergies        Objective     Blood pressure 134/65, pulse 76, temperature 98 8 °F (37 1 °C), temperature source Oral, resp  rate (!) 24, height 5' 11" (1 803 m), weight 70 1 kg (154 lb 8 7 oz), SpO2 96 %  Body mass index is 21 55 kg/m²  Intake/Output Summary (Last 24 hours) at 2/12/2019 1005  Last data filed at 2/12/2019 0601  Gross per 24 hour   Intake 1627 08 ml   Output 200 ml   Net 1427 08 ml         PHYSICAL EXAM:      General Appearance:   Alert, poorly cooperative, no distress, he is unkempt   HEENT:   Normocephalic, atraumatic, icteric      Neck:  Supple, symmetrical, trachea midline   Lungs:   Clear to auscultation bilaterally; no rales, rhonchi or wheezing; respirations unlabored    Heart[de-identified]   Regular rate and rhythm; no murmur, rub, or gallop     Abdomen:   Soft, mildly tender, non-distended; normal bowel sounds; no masses, no organomegaly    Genitalia:   Deferred    Rectal:   Deferred    Extremities:  No cyanosis, clubbing or edema    Pulses:  2+ and symmetric all extremities    Skin:  He is jaundiced, no rashes, or lesions    Lymph nodes:  No palpable cervical lymphadenopathy        Lab Results:   Admission on 02/11/2019   Component Date Value    WBC 02/11/2019 14 34*    RBC 02/11/2019 3 10*    Hemoglobin 02/11/2019 12 0     Hematocrit 02/11/2019 35 1*    MCV 02/11/2019 113*    MCH 02/11/2019 38 7*    MCHC 02/11/2019 34 2     RDW 02/11/2019 16 1*    MPV 02/11/2019 10 4     Platelets 75/36/2336 156     nRBC 02/11/2019 0     Sodium 02/11/2019 133*    Potassium 02/11/2019 5 2     Chloride 02/11/2019 102     CO2 02/11/2019 22     ANION GAP 02/11/2019 9     BUN 02/11/2019 39*    Creatinine 02/11/2019 1 17     Glucose 02/11/2019 118     Calcium 02/11/2019 8 7     AST 02/11/2019 128*    ALT 02/11/2019 132*    Alkaline Phosphatase 02/11/2019 212*    Total Protein 02/11/2019 7 3     Albumin 02/11/2019 3 0*    Total Bilirubin 02/11/2019 7 66*    eGFR 02/11/2019 58     Color, UA 02/11/2019 -     LACTIC ACID 02/11/2019 3 1*    Lipase 02/11/2019 28*    TSH 3RD GENERATON 02/11/2019 0 916     Acetaminophen Level 02/11/2019 <2*    Gram Stain Result 02/11/2019 Gram negative rods*    Gram Stain Result 02/11/2019 Gram positive rods*    Troponin I 02/11/2019 <0 02     Color, UA 02/11/2019 Brown     Clarity, UA 02/11/2019 Cloudy     pH, UA 02/11/2019 5 5     Leukocytes, UA 02/11/2019 Negative     Nitrite, UA 02/11/2019 Negative     Protein, UA 02/11/2019 100 (2+)*    Glucose, UA 02/11/2019 Negative     Ketones, UA 02/11/2019 Trace*    Urobilinogen, UA 02/11/2019 4 0*    Bilirubin, UA 02/11/2019 Interference- unable to analyze*    Blood, UA 02/11/2019 Moderate*    Specific Gravity, UA 02/11/2019 >=1 030     RBC, UA 02/11/2019 4-10*    WBC, UA 02/11/2019 2-4*    Epithelial Cells 02/11/2019 None Seen     Bacteria, UA 02/11/2019 None Seen     Hyaline Casts, UA 02/11/2019 10-25*    Segmented % 02/11/2019 91*    Bands % 02/11/2019 2     Lymphocytes % 02/11/2019 2*    Monocytes % 02/11/2019 4     Eosinophils, % 02/11/2019 0     Basophils % 02/11/2019 0     Atypical Lymphocytes % 02/11/2019 1*    Absolute Neutrophils 02/11/2019 13 34*    Lymphocytes Absolute 02/11/2019 0 29*    Monocytes Absolute 02/11/2019 0 57     Eosinophils Absolute 02/11/2019 0 00     Basophils Absolute 02/11/2019 0 00     RBC Morphology 02/11/2019 Present     Anisocytosis 02/11/2019 Present     Poikilocytes 02/11/2019 Present     Polychromasia 02/11/2019 Present     Platelet Estimate 02/94/7228 Increased*    Protime 02/11/2019 14 8*    INR 02/11/2019 1 15     PTT 02/11/2019 29     LACTIC ACID 02/11/2019 1 5     Ventricular Rate 02/11/2019 79     Atrial Rate 02/11/2019 79     ND Interval 02/11/2019 194     QRSD Interval 02/11/2019 102     QT Interval 02/11/2019 360     QTC Interval 02/11/2019 412     P Axis 02/11/2019 88     QRS Axis 02/11/2019 -65     T Wave Axis 02/11/2019 63     Platelets 43/70/7996 183     MPV 02/12/2019 10 6     WBC 02/12/2019 12 02*    RBC 02/12/2019 3 29*    Hemoglobin 02/12/2019 12 6     Hematocrit 02/12/2019 37 6     MCV 02/12/2019 114*    MCH 02/12/2019 38 3*    MCHC 02/12/2019 33 5     RDW 02/12/2019 16 4*    MPV 02/12/2019 10 9     Platelets 33/50/3063 188     nRBC 02/12/2019 0     Sodium 02/12/2019 139     Potassium 02/12/2019 3 6     Chloride 02/12/2019 107     CO2 02/12/2019 26     ANION GAP 02/12/2019 6     BUN 02/12/2019 22     Creatinine 02/12/2019 0 65     Glucose 02/12/2019 101     Calcium 02/12/2019 8 4     AST 02/12/2019 81*    ALT 02/12/2019 116*    Alkaline Phosphatase 02/12/2019 209*    Total Protein 02/12/2019 7 4     Albumin 02/12/2019 2 9*    Total Bilirubin 02/12/2019 5 42*    eGFR 02/12/2019 91     Magnesium 02/12/2019 2 4     Phosphorus 02/12/2019 2 1*    Protime 02/12/2019 14 8*    INR 02/12/2019 1 15     Calcium, Ionized 02/12/2019 1 08*    Segmented % 02/12/2019 87*    Lymphocytes % 02/12/2019 4*    Monocytes % 02/12/2019 7     Eosinophils, % 02/12/2019 2     Basophils % 02/12/2019 0     Absolute Neutrophils 02/12/2019 10 46*    Lymphocytes Absolute 02/12/2019 0 48*    Monocytes Absolute 02/12/2019 0 84     Eosinophils Absolute 02/12/2019 0 24     Basophils Absolute 02/12/2019 0 00     RBC Morphology 02/12/2019 Present     Anisocytosis 02/12/2019 Present     Macrocytes 02/12/2019 Present     Platelet Estimate 43/80/5908 Adequate     Giant PLTs 02/12/2019 Present        Imaging Studies: I have personally reviewed pertinent imaging studies

## 2019-02-12 NOTE — PROGRESS NOTES
Progress Note - Critical Care Acceptance Note   Lesly Forte 81 y o  male MRN: [id number]  Unit/Bed#: 3150 [facility] -01 Encounter: [id number]    Assessment: 82yo male presents with AMS and severe sepsis 2/2 cholangitis    Plan:     Neuro:   1  Encephalopathy most likely 2/2 severe sepsis   Mental status seems improved on arrival to ICU  Neurochecks Q2hr  Pain: no complain of pain at this time, add analgesia as needed     CAM-ICU  Questionable history of dementia   Hx of JAI, depression   -Hold home lexapro     CV:   No acute issues  Hemodynamically stable   Trop < 02     Lung:   No acute issues   Maintain SpO2 >92%, supplemental O2 if needed   Hx of asbestosis      GI:   1  Cholecystitis/Cholangitis   -Large stone in GB neck, dilatation of CBD  -Zosyn initiated   -, , Alk Phos 212  -GI consulted, plan for ERCP, keep NPO   2  Hyperbilirubinemia   - T Bili 7 66  -Continue to trend     FEN:   F: maintenance NS 125cc/hr  E: Mag >2, Phos >3, K >4  -replete as necessary  N: NPO     :   Total In: 1 6L   Total out/UOP: 200cc, net +1427 1  Strict I&Os  History of BPH   -Home tamsulosin      ID:   1  Severe sepsis 2/2 cholangitis   -Continue zosyn   -WBC 12 02-> 14 34  -Lactic acid 3 1-> 1 5   -2/11 Blood cultures pending     Heme:   Hgb 12  Monitor   DVT ppx: SCDs, SQH     Endo:   No acute issues   Q6hrs accuchecks           Msk/Skin:   Frequent repositioning   Pressure off-loading      Disposition: ICU care    Chief Complaint: "I feel okay, tired "    HPI/24hr events: 82yo male who was brought to the ED by police on 0/04/48 for confusion after a home check was done  Police received a call from unknown bystander who reported  patient had become more jaundice over last few weeks and patient had not been seen in the last two weeks  Per police, patient's house was in poor living conditions with questionable bed bugs  On ED presentation, patient was confused and covered in feces       Physical Exam:   Physical Exam Constitutional: No distress  Alert and oriented to person, place, Not to time or events  Appears chronically ill   HENT:   Head: Normocephalic and atraumatic  Dry oral mucosa   Eyes: Pupils are equal, round, and reactive to light  EOM are normal  Scleral icterus is present  Neck: Neck supple  Cardiovascular: Normal rate and regular rhythm  Pulmonary/Chest: Effort normal and breath sounds normal    Abdominal: Soft  Bowel sounds are normal  He exhibits no distension  There is no tenderness  Musculoskeletal: He exhibits no edema  Neurological: He is alert  No cranial nerve deficit  He exhibits normal muscle tone  Skin: Skin is warm and dry  Capillary refill takes less than 2 seconds  He is not diaphoretic  Mildly jaundice    Psychiatric: He has a normal mood and affect  His behavior is normal          Vitals:    19 0300 19 0400 19 0500 19 0600   BP: 151/70 158/67 119/57 134/65   BP Location:  Right arm     Pulse: 68 66 72 76   Resp: 17 20 18 (!) 24   Temp:  98 8 °F (37 1 °C)     TempSrc:  Oral     SpO2: 97% 97% 96% 96%   Weight:    70 1 kg (154 lb 8 7 oz)   Height:    5' 11" (1 803 m)             Temperature:   Temp (24hrs), Av 5 °F (37 5 °C), Min:98 8 °F (37 1 °C), Max:100 3 °F (37 9 °C)    Current: Temperature: 98 8 °F (37 1 °C)    Weights:   IBW: 75 3 kg    Body mass index is 21 55 kg/m²    Weight (last 2 days)     Date/Time   Weight    19 0600   70 1 (154 54)              Hemodynamic Monitoring:  BP cuff     Non-Invasive/Invasive Ventilation Settings:  Respiratory    Lab Data (Last 4 hours)    None         O2/Vent Data (Last 4 hours)    None              No results found for: PHART, ARZ1JVH, PO2ART, YAD5BDO, A2JIYGNC, BEART, SOURCE      Intake and Outputs:  I/O       02/10 0701 -  0700 701 -  07    IV Piggyback  1000    Total Intake  1000    Net  +1000              UOP: 33cc/hour   Nutrition:        Diet Orders   (From admission, onward) Start     Ordered    02/11/19 2320  Diet NPO  Diet effective now     Question Answer Comment   Diet Type NPO    RD to adjust diet per protocol? Yes        02/11/19 2324        NPO    Labs:   Results from last 7 days   Lab Units 02/12/19  0439 02/12/19  0116 02/11/19  1604   WBC Thousand/uL 12 02*  --  14 34*   HEMOGLOBIN g/dL 12 6  --  12 0   HEMATOCRIT % 37 6  --  35 1*   PLATELETS Thousands/uL 188 183 156   MONO PCT % 7  --  4      Results from last 7 days   Lab Units 02/12/19  0439 02/11/19  1604   SODIUM mmol/L 139 133*   POTASSIUM mmol/L 3 6 5 2   CHLORIDE mmol/L 107 102   CO2 mmol/L 26 22   BUN mg/dL 22 39*   CREATININE mg/dL 0 65 1 17   CALCIUM mg/dL 8 4 8 7   ALK PHOS U/L 209* 212*   ALT U/L 116* 132*   AST U/L 81* 128*     Results from last 7 days   Lab Units 02/12/19  0439   MAGNESIUM mg/dL 2 4     Results from last 7 days   Lab Units 02/12/19  0439   PHOSPHORUS mg/dL 2 1*      Results from last 7 days   Lab Units 02/12/19  0439 02/11/19  1753   INR  1 15 1 15   PTT seconds  --  29     Results from last 7 days   Lab Units 02/11/19  1910   LACTIC ACID mmol/L 1 5     0   Lab Value Date/Time    TROPONINI <0 02 02/11/2019 1612       Imaging:  I have personally reviewed pertinent films in PACS  2/11 CTH: negative  2/11 CTAP: Cholelithiasis with a distended gallbladder and mild diffuse wall thickening  The findings are suspicious for acute cholecystitis  Correlation with laboratory studies is recommended  A right upper quadrant ultrasound could be performed for further   Evaluation  2/11 RUQ U/S: 2 2 cm gallbladder neck gallstone with gallbladder wall thickening and pericholecystic fluid, correlate for acute cholecystitis  9 mm common bile duct  No free air or free fluid      Micro:  No results found for: Jay Tomlinsonor, WOUNDCULT, SPUTUMCULTUR    Allergies: No Known Allergies    Medications:   Scheduled Meds:    Current Facility-Administered Medications:  calcium gluconate 2 g Intravenous Once Lupillo Key MD    chlorhexidine 15 mL Swish & Spit Q12H [facility] Lupillo Key MD    heparin (porcine) 5,000 Units Subcutaneous [facility] Lupillo Key MD    piperacillin-tazobactam 4 5 g Intravenous Q8H Lupillo Key MD Last Rate: 4 5 g (02/12/19 0737)   potassium phosphate 12 mmol Intravenous Once Lupillo Key MD    sodium chloride 125 mL/hr Intravenous Continuous Lupillo Key MD Last Rate: 125 mL/hr (02/12/19 0100)     Continuous Infusions:    sodium chloride 125 mL/hr Last Rate: 125 mL/hr (02/12/19 0100)     PRN Meds:       VTE Pharmacologic Prophylaxis: Heparin  VTE Mechanical Prophylaxis: sequential compression device    Invasive lines and devices: Invasive Devices     Peripheral Intravenous Line            Peripheral IV 02/11/19 Left Forearm less than 1 day    Peripheral IV 02/11/19 Right Antecubital less than 1 day    Peripheral IV 02/12/19 Distal;Left;Upper Antecubital less than 1 day                   Counseling / Coordination of Care  Total Critical Care time spent 40 minutes excluding procedures, teaching and family updates  Code Status: Level 1 - Full Code     Portions of the record may have been created with voice recognition software  Occasional wrong word or "sound a like" substitutions may have occurred due to the inherent limitations of voice recognition software  Read the chart carefully and recognize, using context, where substitutions have occurred       Lupillo Key MD

## 2019-02-12 NOTE — PROGRESS NOTES
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  Stable    - Tbili 5 4 (from 7 6)  - WBC 12 (from 14 3)    Plan:  NPO w/ IVF  F/U GI consult- likely ERCP vs MRCP then ERCP  Trend bilirubin  Continue Zosyn  Reorient as needed  PRN pain control- no pain at present  F/U blood cultures    Subjective/Objective   Chief Complaint:     Subjective: Slightly confused at times, forgetting where / why he is here but otherwise no issues  Denies pain  No N/V  Has been afebrile overnight  Hemodynamically stable  Objective:     Blood pressure 134/65, pulse 76, temperature 98 8 °F (37 1 °C), temperature source Oral, resp  rate (!) 24, SpO2 96 %  ,There is no height or weight on file to calculate BMI        Intake/Output Summary (Last 24 hours) at 2/12/2019 0621  Last data filed at 2/12/2019 0601  Gross per 24 hour   Intake 1627 08 ml   Output 200 ml   Net 1427 08 ml       Invasive Devices     Peripheral Intravenous Line            Peripheral IV 02/11/19 Left Forearm less than 1 day    Peripheral IV 02/11/19 Right Antecubital less than 1 day    Peripheral IV 02/12/19 Distal;Left;Upper Antecubital less than 1 day                Physical Exam:   Gen: A&O, NAD  Cardio: RRR  Lungs: CTAB  Abd: Soft, non distended, non tender, negative murphys      Lab, Imaging and other studies:  CBC:   Lab Results   Component Value Date    WBC 12 02 (H) 02/12/2019    HGB 12 6 02/12/2019    HCT 37 6 02/12/2019     (H) 02/12/2019     02/12/2019    MCH 38 3 (H) 02/12/2019    MCHC 33 5 02/12/2019    RDW 16 4 (H) 02/12/2019    MPV 10 9 02/12/2019    NRBC 0 02/11/2019   , CMP:   Lab Results   Component Value Date    SODIUM 139 02/12/2019    K 3 6 02/12/2019     02/12/2019    CO2 26 02/12/2019    BUN 22 02/12/2019    CREATININE 0 65 02/12/2019    CALCIUM 8 4 02/12/2019    AST 81 (H) 02/12/2019     (H) 02/12/2019    ALKPHOS 209 (H) 02/12/2019    EGFR 91 02/12/2019   , Coagulation:   Lab Results   Component Value Date    INR 1 15 02/12/2019     VTE Pharmacologic Prophylaxis: Heparin  VTE Mechanical Prophylaxis: sequential compression device

## 2019-02-12 NOTE — OP NOTE
OPERATIVE REPORT  PATIENT NAME: Aylin Guillory    :  1935  MRN: [id number]  Pt Location: [facility] 04    SURGERY DATE: 2019    Surgeon(s) and Role:     Pilar Field MD - Primary    Preop Diagnosis:  Cholecystitis [K81 9]  Hyperbilirubinemia [E80 6]    Post-Op Diagnosis Codes:     * Cholecystitis [K81 9]     * Hyperbilirubinemia [E80 6]    Procedure(s) (LRB):  ENDOSCOPIC RETROGRADE CHOLANGIOPANCREATOGRAPHY (ERCP) (N/A)    Specimen(s):  ID Type Source Tests Collected by Time Destination   1 : Gastric antrum polyp-cold bx Tissue Polyp, Stomach/Small Intestine TISSUE EXAM Darrin Davies MD 2019 1455    2 : Gastric body bx-cold bx Tissue Stomach TISSUE EXAM Darrin Davies MD 2019 1456    3 : Esophagus bx-cold bx Tissue Esophagus TISSUE EXAM Darrin Davies MD 2019 1457        Estimated Blood Loss:   0 mL    ENDOSCOPIC RETROGRADE CHOLAGIOPANCREATOGRAPHY    PROCEDURE: ERCP/ Sphincterotomy, Stone Extraction with Balloon, Biopsy    INDICATIONS: Elevated Liver Enzymes, Choledocholithiasis and Cholangitis    POST-OP DIAGNOSIS: See the impression below    SEDATION: Monitored anesthesia care, check anesthesia records    CONSENT:  Informed consent was obtained for the procedure, including sedation after explaining the risks and benefits of the procedure  Risks including but not limited to severe pancreatitis, bleeding, perforation, infection, aspiration were discussed in detail  Also explained about less than 100% sensitivity with the exam and other alternatives  PREPARATION:   EKG tracing, pulse oximetry, blood pressure were monitored throughout the procedure  Patient was identified by myself both verbally and by visual inspection of ID band  DESCRIPTION:   Patient was placed in the prone position and was sedated with the above medication   The standard lateral viewing gastroduodenoscope was introduced in to the oropharynx and the esophagus was intubated under direct visualization using sliding technique  Scope was passed down the esophagus up to the second part of the duodenum  The scope was withdrawn and noted the ampulla on the medial wall  Ampulla was cannulated with sphinctertome catheter and cholangiogram was performed  FINDINGS:    Esophagus  Possible white plaques suggestive of Candida esophagitis  Biopsies were done  Stomach  There was a large bilobed polyp measuring approximately 3 cm at the gastric antrum/pylorus  Biopsies were done  The rest of the stomach appeared to be normal   Biopsies were done of the gastric body  Duodenum  The duodenal bulb and 2nd portion were normal     ERCP  The ampulla was identified  It was quite prominent  The bile duct was successfully cannulated at 1st attempt  Contrast injection showed a 9-10 mm bile duct with a distal filling defect  A generous sphincterotomy was performed  Balloon sweeps were done using a 9-12 mm extraction balloon  One stone was removed and some sludge was removed as well  Occlusion cholangiogram did not show any residual filling defects  There was good contrast and bile drainage  The procedure was then terminated  IMPRESSIONS:      1  Successful ERCP for choledocholithiasis with sphincterotomy, stone extraction  2  Gastric antrum/pyloric region polyp  Biopsies were done  3  Otherwise normal stomach  Random biopsies were done  4  Possible Candida esophagitis  Biopsies were done  RECOMMENDATIONS:     1  Follow up with the results of the biopsies  2  Continue antibiotics  3  Follow LFTs  4  Repeat endoscopy in the next 1-2 months to address the gastric polyp  COMPLICATIONS: None; patient tolerated the procedure well          DISPOSITION: PACU           CONDITION: Stable

## 2019-02-12 NOTE — SEPSIS NOTE
Lactic acid cleared  Sepsis Note   Dre Hammond 81 y o  male MRN: [id number]  Unit/Bed#: ED 13 Encounter: [id number]      Initial Sepsis Screening     Row Name 02/11/19 1711                Is the patient's history suggestive of a new or worsening infection? Yes (Proceed)  (Abnormal)   -DS        Suspected source of infection  acute abdominal infection;suspect infection, source unknown  -DS        Are two or more of the following signs & symptoms of infection both present and new to the patient? Yes (Proceed)  (Abnormal)   -DS        Indicate SIRS criteria  Altered mental status; Tachycardia > 90 bpm;Leukocytosis (WBC > 54267 IJL)  -DS        If the answer is yes to both questions, suspicion of sepsis is present          If severe sepsis is present AND tissue hypoperfusion perists in the hour after fluid resuscitation or lactate > 4, the patient meets criteria for SEPTIC SHOCK          Are any of the following organ dysfunction criteria present within 6 hours of suspected infection and SIRS criteria that are NOT considered to be chronic conditions? Yes  (Abnormal)   -DS        Organ dysfunction  Lactate > 2 0 mmol/L  -DS        Date of presentation of severe sepsis  02/11/19  -DS        Time of presentation of severe sepsis  1700  -DS        Tissue hypoperfusion persists in the hour after crystalloid fluid administration, evidenced, by either:          Was hypotension present within one hour of the conclusion of crystalloid fluid administration?           Date of presentation of septic shock          Time of presentation of septic shock            User Key  (r) = Recorded By, (t) = Taken By, (c) = Cosigned By    [address] Name Provider Type    DS Teodora Morgan DO Physician               Default [facility] (last 720 hours)      Sepsis Reassess     Row Name 02/11/19 2004                   Repeat Volume Status and Tissue Perfusion Assessment Performed    Repeat Volume Status and Tissue Perfusion Assessment Performed             Volume Status and Tissue Perfusion Post Fluid Resuscitation * Must Document All *    Vital Signs Reviewed (HR, RR, BP, T)  Yes  -DS        Shock Index Reviewed          Arterial Oxygen Saturation Reviewed (POx, SaO2 or SpO2)          Cardio  Normal S1/S2  -DS        Pulmonary  Normal effort  -DS        Capillary Refill  Brisk  -DS        Peripheral Pulses  Radial;Dorsalis Pedis  -DS        Peripheral Pulse  +2  -DS        Dorsalis Pedis  +2  -DS        Skin  Warm;Dry  -DS        Urine output assessed  Adequate  -DS           *OR*   Intensive Monitoring- Must Document One of the Following Four *:    Vital Signs Reviewed          * Central Venous Pressure (CVP or RAP)          * Central Venous Oxygen (SVO2, ScvO2 or Oxygen saturation via central catheter)          * Bedside Cardiovascular US in IVC diameter and % collapse          * Passive Leg Raise OR Crystalloid Challenge            User Key  (r) = Recorded By, (t) = Taken By, (c) = Cosigned By    Initials Name Provider Type    DEVONTE Rusesll Kinds, DO Physician

## 2019-02-12 NOTE — H&P
H&P Exam - General Surgery   Soumya Lyn 81 y o  male MRN: [id number]  Unit/Bed#: ED 13 Encounter: [id number]    Assessment/Plan     Assessment:  83M with likely cholangitis  He presented with confusion and inability to meet his ADLs  He has a CT scan that is concerning for acute cholecystitis, but with other lab values and presentation, cholangitis is more likely  Plan:  -ICU admission  -NPO/IVF  -Monitor lytes, replete as necessary  -trend WBC  -DVT ppx    History of Present Illness   HPI:  Soumya Lyn is a 81 y o  male who presents with confusion  He is also significantly jaundiced  He was sent to the ED by ambulance  Someone who knows him at home reported increasing jaundice followed by not seeing the patient for 2 weeks  He arrived confused and covered in feces  I spoke with his daughter at length by phone and she was unable to provide any significant health history  She does not believe he takes and medications or has any long term health conditions  She is his only adult child and he has no spouse  Sabra Dai [phone]    Review of Systems   Unable to perform ROS: Mental status change       Historical Information   No past medical history on file  No past surgical history on file  Social History   Social History     Substance and Sexual Activity   Alcohol Use Not on file     Social History     Substance and Sexual Activity   Drug Use Not on file     Social History     Tobacco Use   Smoking Status Not on file     Family History: No family history on file      Meds/Allergies   all medications and allergies reviewed  No Known Allergies    Objective   First Vitals:   Blood Pressure: 140/74 (02/11/19 1559)  Pulse: 91 (02/11/19 1559)  Temperature: 100 3 °F (37 9 °C) (02/11/19 1559)  Temp Source: Rectal (02/11/19 1559)  Respirations: 20 (02/11/19 1559)  SpO2: 97 % (02/11/19 1559)    Current Vitals:   Blood Pressure: 141/64 (02/11/19 1759)  Pulse: 86 (02/11/19 1759)  Temperature: 100 3 °F (37 9 °C) (02/11/19 1559)  Temp Source: Rectal (02/11/19 1559)  Respirations: 17 (02/11/19 1759)  SpO2: 98 % (02/11/19 1759)    No intake or output data in the 24 hours ending 02/11/19 1939    Invasive Devices     Peripheral Intravenous Line            Peripheral IV 02/11/19 Left Forearm less than 1 day                Physical Exam   Constitutional: He appears well-developed and well-nourished  No distress  HENT:   Head: Normocephalic and atraumatic  Right Ear: External ear normal    Left Ear: External ear normal    Eyes: Pupils are equal, round, and reactive to light  EOM are normal  Right eye exhibits no discharge  Left eye exhibits no discharge  Scleral icterus is present  Neck: No tracheal deviation present  Cardiovascular: Normal rate  Pulmonary/Chest: Effort normal  No respiratory distress  Abdominal: Soft  He exhibits no distension and no ascites  There is no tenderness  There is no rigidity, no rebound and no guarding  Neurological: He is alert  Coordination normal  GCS eye subscore is 4  GCS verbal subscore is 4  GCS motor subscore is 6  Skin: No rash noted  He is not diaphoretic  Vitals reviewed        Lab Results:   CBC:   Lab Results   Component Value Date    WBC 14 34 (H) 02/11/2019    HGB 12 0 02/11/2019    HCT 35 1 (L) 02/11/2019     (H) 02/11/2019     02/11/2019    MCH 38 7 (H) 02/11/2019    MCHC 34 2 02/11/2019    RDW 16 1 (H) 02/11/2019    MPV 10 4 02/11/2019    NRBC 0 02/11/2019   , CMP:   Lab Results   Component Value Date    SODIUM 133 (L) 02/11/2019    K 5 2 02/11/2019     02/11/2019    CO2 22 02/11/2019    BUN 39 (H) 02/11/2019    CREATININE 1 17 02/11/2019    CALCIUM 8 7 02/11/2019     (H) 02/11/2019     (H) 02/11/2019    ALKPHOS 212 (H) 02/11/2019    EGFR 58 02/11/2019   , Urinalysis:   Lab Results   Component Value Date    COLORU Brown 02/11/2019    CLARITYU Cloudy 02/11/2019    SPECGRAV >=1 030 02/11/2019    PHUR 5 5 02/11/2019    LEUKOCYTESUR Negative 02/11/2019    NITRITE Negative 02/11/2019    GLUCOSEU Negative 02/11/2019    KETONESU Trace (A) 02/11/2019    BILIRUBINUR Interference- unable to analyze (A) 02/11/2019    BLOODU Moderate (A) 02/11/2019   , Amylase: No results found for: AMYLASE, Lipase:   Lab Results   Component Value Date    LIPASE 28 (L) 02/11/2019     Imaging: I have personally reviewed pertinent reports  and I have personally reviewed pertinent films in PACS  EKG, Pathology, and Other Studies: I have personally reviewed pertinent reports        Code Status: No Order  Advance Directive and Living Will:      Power of :    POLST:

## 2019-02-12 NOTE — ANESTHESIA PREPROCEDURE EVALUATION
Review of Systems/Medical History  Patient summary reviewed  Chart reviewed  No history of anesthetic complications     Cardiovascular  EKG reviewed, Hypertension controlled,   Comment: Left anterior fasicular block,  Pulmonary  Negative pulmonary ROS        GI/Hepatic      Comment: Transaminitis  Hyperbilirubinemia  Cholelithiasis with cholecystitis     Prostatic disorder, benign prostatic hyperplasia       Endo/Other    Comment: Sepsis    GYN       Hematology  Negative hematology ROS      Musculoskeletal  Negative musculoskeletal ROS        Neurology      Comment: Encephalopathy  Suspected dementia as per outpatient internal medicine note from [facility] (1/4/19) Psychology   Negative psychology ROS              Physical Exam    Airway    Mallampati score: II  TM Distance: >3 FB  Neck ROM: full     Dental   lower dentures and upper dentures,     Cardiovascular  Rhythm: regular, Rate: normal, Cardiovascular exam normal    Pulmonary  Pulmonary exam normal Breath sounds clear to auscultation,     Other Findings        Anesthesia Plan  ASA Score- 3     Anesthesia Type- general with ASA Monitors  Additional Monitors:   Airway Plan: ETT  Plan Factors-    Induction- intravenous  Postoperative Plan-   Planned trial extubation    Informed Consent- Anesthetic plan and risks discussed with patient and daughter  I personally reviewed this patient with the CRNA  Discussed and agreed on the Anesthesia Plan with the CRNA         NPO verified  NKDA  Plan:  GETA    Risks and benefits discussed with patient's daughter Alexei Ibarra  Questions answered  Telephone consent obtained from patient's daughter Alexei Ibarra ([phone]) as patient is not consentable due to his encephalopathy and concern for underlying dementia

## 2019-02-12 NOTE — PROGRESS NOTES
Pt  Returned from gi lab into icu 202 awake but confused to month and year, and situation , cooperative at this time  Pt reoriented to surroundings, tentartive or for cholecystectomy in am will start clears this pm  New left forarm iv nsl placed

## 2019-02-12 NOTE — ED NOTES
Bed bath given at this time  All linens changed and clothing double bagged        Jose Marquis RN  02/11/19 2103

## 2019-02-13 ENCOUNTER — ANESTHESIA (INPATIENT)
Dept: PERIOP | Facility: HOSPITAL | Age: 84
DRG: 854 | End: 2019-02-13
Payer: COMMERCIAL

## 2019-02-13 ENCOUNTER — ANESTHESIA EVENT (INPATIENT)
Dept: PERIOP | Facility: HOSPITAL | Age: 84
DRG: 854 | End: 2019-02-13
Payer: COMMERCIAL

## 2019-02-13 PROBLEM — R41.0 DELIRIUM: Status: ACTIVE | Noted: 2019-02-13

## 2019-02-13 PROBLEM — Z91.81 HX OF FALL: Status: ACTIVE | Noted: 2019-02-13

## 2019-02-13 PROBLEM — R53.81 PHYSICAL DECONDITIONING: Status: ACTIVE | Noted: 2019-02-13

## 2019-02-13 PROBLEM — F03.90 DEMENTIA (HCC): Status: ACTIVE | Noted: 2019-02-13

## 2019-02-13 PROBLEM — E88.09 HYPOALBUMINEMIA: Status: ACTIVE | Noted: 2019-02-13

## 2019-02-13 LAB
ABO GROUP BLD: NORMAL
ALBUMIN SERPL BCP-MCNC: 2.2 G/DL (ref 3.5–5)
ALP SERPL-CCNC: 163 U/L (ref 46–116)
ALT SERPL W P-5'-P-CCNC: 74 U/L (ref 12–78)
ANION GAP SERPL CALCULATED.3IONS-SCNC: 8 MMOL/L (ref 4–13)
AST SERPL W P-5'-P-CCNC: 49 U/L (ref 5–45)
BASOPHILS # BLD AUTO: 0.02 THOUSANDS/ΜL (ref 0–0.1)
BASOPHILS NFR BLD AUTO: 0 % (ref 0–1)
BILIRUB SERPL-MCNC: 3.19 MG/DL (ref 0.2–1)
BLD GP AB SCN SERPL QL: NEGATIVE
BUN SERPL-MCNC: 21 MG/DL (ref 5–25)
CALCIUM SERPL-MCNC: 8.1 MG/DL (ref 8.3–10.1)
CHLORIDE SERPL-SCNC: 108 MMOL/L (ref 100–108)
CO2 SERPL-SCNC: 24 MMOL/L (ref 21–32)
CREAT SERPL-MCNC: 0.58 MG/DL (ref 0.6–1.3)
EOSINOPHIL # BLD AUTO: 0.12 THOUSAND/ΜL (ref 0–0.61)
EOSINOPHIL NFR BLD AUTO: 1 % (ref 0–6)
ERYTHROCYTE [DISTWIDTH] IN BLOOD BY AUTOMATED COUNT: 16.3 % (ref 11.6–15.1)
ERYTHROCYTE [DISTWIDTH] IN BLOOD BY AUTOMATED COUNT: 16.4 % (ref 11.6–15.1)
GFR SERPL CREATININE-BSD FRML MDRD: 95 ML/MIN/1.73SQ M
GLUCOSE SERPL-MCNC: 101 MG/DL (ref 65–140)
HCT VFR BLD AUTO: 28.8 % (ref 36.5–49.3)
HCT VFR BLD AUTO: 32 % (ref 36.5–49.3)
HGB BLD-MCNC: 10.3 G/DL (ref 12–17)
HGB BLD-MCNC: 9.6 G/DL (ref 12–17)
IMM GRANULOCYTES # BLD AUTO: 0.08 THOUSAND/UL (ref 0–0.2)
IMM GRANULOCYTES NFR BLD AUTO: 1 % (ref 0–2)
LYMPHOCYTES # BLD AUTO: 0.63 THOUSANDS/ΜL (ref 0.6–4.47)
LYMPHOCYTES NFR BLD AUTO: 5 % (ref 14–44)
MAGNESIUM SERPL-MCNC: 2.5 MG/DL (ref 1.6–2.6)
MCH RBC QN AUTO: 37.3 PG (ref 26.8–34.3)
MCH RBC QN AUTO: 38.6 PG (ref 26.8–34.3)
MCHC RBC AUTO-ENTMCNC: 32.2 G/DL (ref 31.4–37.4)
MCHC RBC AUTO-ENTMCNC: 33.3 G/DL (ref 31.4–37.4)
MCV RBC AUTO: 116 FL (ref 82–98)
MCV RBC AUTO: 116 FL (ref 82–98)
MONOCYTES # BLD AUTO: 0.79 THOUSAND/ΜL (ref 0.17–1.22)
MONOCYTES NFR BLD AUTO: 6 % (ref 4–12)
NEUTROPHILS # BLD AUTO: 11.63 THOUSANDS/ΜL (ref 1.85–7.62)
NEUTS SEG NFR BLD AUTO: 87 % (ref 43–75)
NRBC BLD AUTO-RTO: 0 /100 WBCS
PHOSPHATE SERPL-MCNC: 2.3 MG/DL (ref 2.3–4.1)
PLATELET # BLD AUTO: 172 THOUSANDS/UL (ref 149–390)
PLATELET # BLD AUTO: 178 THOUSANDS/UL (ref 149–390)
PMV BLD AUTO: 11 FL (ref 8.9–12.7)
PMV BLD AUTO: 11.6 FL (ref 8.9–12.7)
POTASSIUM SERPL-SCNC: 4.5 MMOL/L (ref 3.5–5.3)
PROT SERPL-MCNC: 5.8 G/DL (ref 6.4–8.2)
RBC # BLD AUTO: 2.49 MILLION/UL (ref 3.88–5.62)
RBC # BLD AUTO: 2.76 MILLION/UL (ref 3.88–5.62)
RH BLD: POSITIVE
SODIUM SERPL-SCNC: 140 MMOL/L (ref 136–145)
SPECIMEN EXPIRATION DATE: NORMAL
WBC # BLD AUTO: 12.63 THOUSAND/UL (ref 4.31–10.16)
WBC # BLD AUTO: 13.27 THOUSAND/UL (ref 4.31–10.16)

## 2019-02-13 PROCEDURE — 88304 TISSUE EXAM BY PATHOLOGIST: CPT | Performed by: PATHOLOGY

## 2019-02-13 PROCEDURE — 47562 LAPAROSCOPIC CHOLECYSTECTOMY: CPT | Performed by: SURGERY

## 2019-02-13 PROCEDURE — 83735 ASSAY OF MAGNESIUM: CPT | Performed by: INTERNAL MEDICINE

## 2019-02-13 PROCEDURE — 86901 BLOOD TYPING SEROLOGIC RH(D): CPT | Performed by: SURGERY

## 2019-02-13 PROCEDURE — 86850 RBC ANTIBODY SCREEN: CPT | Performed by: SURGERY

## 2019-02-13 PROCEDURE — 86900 BLOOD TYPING SEROLOGIC ABO: CPT | Performed by: SURGERY

## 2019-02-13 PROCEDURE — 85025 COMPLETE CBC W/AUTO DIFF WBC: CPT | Performed by: INTERNAL MEDICINE

## 2019-02-13 PROCEDURE — 99223 1ST HOSP IP/OBS HIGH 75: CPT | Performed by: INTERNAL MEDICINE

## 2019-02-13 PROCEDURE — 99232 SBSQ HOSP IP/OBS MODERATE 35: CPT | Performed by: INTERNAL MEDICINE

## 2019-02-13 PROCEDURE — 84100 ASSAY OF PHOSPHORUS: CPT | Performed by: INTERNAL MEDICINE

## 2019-02-13 PROCEDURE — 86920 COMPATIBILITY TEST SPIN: CPT

## 2019-02-13 PROCEDURE — 0FT44ZZ RESECTION OF GALLBLADDER, PERCUTANEOUS ENDOSCOPIC APPROACH: ICD-10-PCS | Performed by: SURGERY

## 2019-02-13 PROCEDURE — 80053 COMPREHEN METABOLIC PANEL: CPT | Performed by: INTERNAL MEDICINE

## 2019-02-13 PROCEDURE — 85027 COMPLETE CBC AUTOMATED: CPT | Performed by: SURGERY

## 2019-02-13 RX ORDER — ONDANSETRON 2 MG/ML
INJECTION INTRAMUSCULAR; INTRAVENOUS AS NEEDED
Status: DISCONTINUED | OUTPATIENT
Start: 2019-02-13 | End: 2019-02-13 | Stop reason: SURG

## 2019-02-13 RX ORDER — ESCITALOPRAM OXALATE 10 MG/1
10 TABLET ORAL
COMMUNITY
Start: 2019-01-04 | End: 2020-01-04

## 2019-02-13 RX ORDER — CEFAZOLIN SODIUM 1 G/50ML
SOLUTION INTRAVENOUS AS NEEDED
Status: DISCONTINUED | OUTPATIENT
Start: 2019-02-13 | End: 2019-02-13 | Stop reason: SURG

## 2019-02-13 RX ORDER — AMOXICILLIN 250 MG
1 CAPSULE ORAL
Status: DISCONTINUED | OUTPATIENT
Start: 2019-02-13 | End: 2019-02-19 | Stop reason: HOSPADM

## 2019-02-13 RX ORDER — OXYCODONE HYDROCHLORIDE 5 MG/1
2.5 TABLET ORAL EVERY 4 HOURS PRN
Status: DISCONTINUED | OUTPATIENT
Start: 2019-02-13 | End: 2019-02-19 | Stop reason: HOSPADM

## 2019-02-13 RX ORDER — ONDANSETRON 2 MG/ML
4 INJECTION INTRAMUSCULAR; INTRAVENOUS ONCE AS NEEDED
Status: DISCONTINUED | OUTPATIENT
Start: 2019-02-13 | End: 2019-02-13

## 2019-02-13 RX ORDER — TAMSULOSIN HYDROCHLORIDE 0.4 MG/1
0.4 CAPSULE ORAL
COMMUNITY
Start: 2019-01-04

## 2019-02-13 RX ORDER — GLYCOPYRROLATE 0.2 MG/ML
INJECTION INTRAMUSCULAR; INTRAVENOUS AS NEEDED
Status: DISCONTINUED | OUTPATIENT
Start: 2019-02-13 | End: 2019-02-13 | Stop reason: SURG

## 2019-02-13 RX ORDER — TAMSULOSIN HYDROCHLORIDE 0.4 MG/1
0.4 CAPSULE ORAL
Status: DISCONTINUED | OUTPATIENT
Start: 2019-02-13 | End: 2019-02-19 | Stop reason: HOSPADM

## 2019-02-13 RX ORDER — EPHEDRINE SULFATE 50 MG/ML
INJECTION, SOLUTION INTRAVENOUS AS NEEDED
Status: DISCONTINUED | OUTPATIENT
Start: 2019-02-13 | End: 2019-02-13 | Stop reason: SURG

## 2019-02-13 RX ORDER — PROMETHAZINE HYDROCHLORIDE 25 MG/ML
12.5 INJECTION, SOLUTION INTRAMUSCULAR; INTRAVENOUS ONCE AS NEEDED
Status: DISCONTINUED | OUTPATIENT
Start: 2019-02-13 | End: 2019-02-13

## 2019-02-13 RX ORDER — BUPIVACAINE HYDROCHLORIDE 5 MG/ML
INJECTION, SOLUTION PERINEURAL AS NEEDED
Status: DISCONTINUED | OUTPATIENT
Start: 2019-02-13 | End: 2019-02-13 | Stop reason: HOSPADM

## 2019-02-13 RX ORDER — ROCURONIUM BROMIDE 10 MG/ML
INJECTION, SOLUTION INTRAVENOUS AS NEEDED
Status: DISCONTINUED | OUTPATIENT
Start: 2019-02-13 | End: 2019-02-13 | Stop reason: SURG

## 2019-02-13 RX ORDER — HYDROMORPHONE HCL/PF 1 MG/ML
0.2 SYRINGE (ML) INJECTION EVERY 4 HOURS PRN
Status: DISCONTINUED | OUTPATIENT
Start: 2019-02-13 | End: 2019-02-19 | Stop reason: HOSPADM

## 2019-02-13 RX ORDER — ESCITALOPRAM OXALATE 10 MG/1
10 TABLET ORAL DAILY
Status: DISCONTINUED | OUTPATIENT
Start: 2019-02-13 | End: 2019-02-19 | Stop reason: HOSPADM

## 2019-02-13 RX ORDER — FENTANYL CITRATE 50 UG/ML
INJECTION, SOLUTION INTRAMUSCULAR; INTRAVENOUS AS NEEDED
Status: DISCONTINUED | OUTPATIENT
Start: 2019-02-13 | End: 2019-02-13 | Stop reason: SURG

## 2019-02-13 RX ORDER — PROPOFOL 10 MG/ML
INJECTION, EMULSION INTRAVENOUS AS NEEDED
Status: DISCONTINUED | OUTPATIENT
Start: 2019-02-13 | End: 2019-02-13 | Stop reason: SURG

## 2019-02-13 RX ORDER — ASPIRIN 81 MG/1
TABLET ORAL
COMMUNITY
Start: 2018-02-16

## 2019-02-13 RX ORDER — ALBUMIN, HUMAN INJ 5% 5 %
SOLUTION INTRAVENOUS CONTINUOUS PRN
Status: DISCONTINUED | OUTPATIENT
Start: 2019-02-13 | End: 2019-02-13 | Stop reason: SURG

## 2019-02-13 RX ORDER — OXYCODONE HYDROCHLORIDE 5 MG/1
5 TABLET ORAL EVERY 4 HOURS PRN
Status: DISCONTINUED | OUTPATIENT
Start: 2019-02-13 | End: 2019-02-19 | Stop reason: HOSPADM

## 2019-02-13 RX ORDER — SODIUM CHLORIDE, SODIUM LACTATE, POTASSIUM CHLORIDE, CALCIUM CHLORIDE 600; 310; 30; 20 MG/100ML; MG/100ML; MG/100ML; MG/100ML
INJECTION, SOLUTION INTRAVENOUS CONTINUOUS PRN
Status: DISCONTINUED | OUTPATIENT
Start: 2019-02-13 | End: 2019-02-13 | Stop reason: SURG

## 2019-02-13 RX ORDER — FENTANYL CITRATE/PF 50 MCG/ML
25 SYRINGE (ML) INJECTION
Status: DISCONTINUED | OUTPATIENT
Start: 2019-02-13 | End: 2019-02-13

## 2019-02-13 RX ORDER — LIDOCAINE HYDROCHLORIDE 10 MG/ML
INJECTION, SOLUTION INFILTRATION; PERINEURAL AS NEEDED
Status: DISCONTINUED | OUTPATIENT
Start: 2019-02-13 | End: 2019-02-13 | Stop reason: SURG

## 2019-02-13 RX ADMIN — FENTANYL CITRATE 25 MCG: 50 INJECTION, SOLUTION INTRAMUSCULAR; INTRAVENOUS at 15:14

## 2019-02-13 RX ADMIN — GLYCOPYRROLATE 0.6 MG: 0.2 INJECTION, SOLUTION INTRAMUSCULAR; INTRAVENOUS at 13:14

## 2019-02-13 RX ADMIN — PIPERACILLIN SODIUM,TAZOBACTAM SODIUM 4.5 G: 4; .5 INJECTION, POWDER, FOR SOLUTION INTRAVENOUS at 02:05

## 2019-02-13 RX ADMIN — ONDANSETRON 4 MG: 2 INJECTION INTRAMUSCULAR; INTRAVENOUS at 12:04

## 2019-02-13 RX ADMIN — EPHEDRINE SULFATE 10 MG: 50 INJECTION, SOLUTION INTRAMUSCULAR; INTRAVENOUS; SUBCUTANEOUS at 11:26

## 2019-02-13 RX ADMIN — DEXAMETHASONE SODIUM PHOSPHATE 4 MG: 10 INJECTION INTRAMUSCULAR; INTRAVENOUS at 12:04

## 2019-02-13 RX ADMIN — CEFAZOLIN SODIUM 1000 MG: 1 SOLUTION INTRAVENOUS at 11:25

## 2019-02-13 RX ADMIN — ROCURONIUM BROMIDE 10 MG: 10 INJECTION INTRAVENOUS at 11:25

## 2019-02-13 RX ADMIN — TAMSULOSIN HYDROCHLORIDE 0.4 MG: 0.4 CAPSULE ORAL at 17:59

## 2019-02-13 RX ADMIN — SODIUM CHLORIDE, SODIUM LACTATE, POTASSIUM CHLORIDE, AND CALCIUM CHLORIDE: .6; .31; .03; .02 INJECTION, SOLUTION INTRAVENOUS at 11:36

## 2019-02-13 RX ADMIN — PIPERACILLIN SODIUM,TAZOBACTAM SODIUM 4.5 G: 4; .5 INJECTION, POWDER, FOR SOLUTION INTRAVENOUS at 22:43

## 2019-02-13 RX ADMIN — HEPARIN SODIUM 5000 UNITS: 5000 INJECTION INTRAVENOUS; SUBCUTANEOUS at 05:20

## 2019-02-13 RX ADMIN — FENTANYL CITRATE 25 MCG: 50 INJECTION, SOLUTION INTRAMUSCULAR; INTRAVENOUS at 15:20

## 2019-02-13 RX ADMIN — HEPARIN SODIUM 5000 UNITS: 5000 INJECTION INTRAVENOUS; SUBCUTANEOUS at 22:43

## 2019-02-13 RX ADMIN — ROCURONIUM BROMIDE 35 MG: 10 INJECTION INTRAVENOUS at 11:05

## 2019-02-13 RX ADMIN — LIDOCAINE HYDROCHLORIDE 100 MG: 10 INJECTION, SOLUTION INFILTRATION; PERINEURAL at 11:05

## 2019-02-13 RX ADMIN — SODIUM CHLORIDE, SODIUM GLUCONATE, SODIUM ACETATE, POTASSIUM CHLORIDE AND MAGNESIUM CHLORIDE 100 ML/HR: 526; 502; 368; 37; 30 INJECTION, SOLUTION INTRAVENOUS at 13:55

## 2019-02-13 RX ADMIN — FENTANYL CITRATE 50 MCG: 50 INJECTION, SOLUTION INTRAMUSCULAR; INTRAVENOUS at 11:49

## 2019-02-13 RX ADMIN — PROPOFOL 100 MG: 10 INJECTION, EMULSION INTRAVENOUS at 11:05

## 2019-02-13 RX ADMIN — ALBUMIN (HUMAN): 12.5 SOLUTION INTRAVENOUS at 12:57

## 2019-02-13 RX ADMIN — PIPERACILLIN SODIUM,TAZOBACTAM SODIUM 4.5 G: 4; .5 INJECTION, POWDER, FOR SOLUTION INTRAVENOUS at 07:45

## 2019-02-13 RX ADMIN — NEOSTIGMINE METHYLSULFATE 4 MG: 1 INJECTION INTRAMUSCULAR; INTRAVENOUS; SUBCUTANEOUS at 13:14

## 2019-02-13 RX ADMIN — EPHEDRINE SULFATE 10 MG: 50 INJECTION, SOLUTION INTRAMUSCULAR; INTRAVENOUS; SUBCUTANEOUS at 11:16

## 2019-02-13 RX ADMIN — SENNOSIDES AND DOCUSATE SODIUM 1 TABLET: 8.6; 5 TABLET ORAL at 22:43

## 2019-02-13 RX ADMIN — SODIUM CHLORIDE, SODIUM GLUCONATE, SODIUM ACETATE, POTASSIUM CHLORIDE AND MAGNESIUM CHLORIDE 100 ML/HR: 526; 502; 368; 37; 30 INJECTION, SOLUTION INTRAVENOUS at 20:30

## 2019-02-13 NOTE — CONSULTS
Consultation - Geriatrics   Jalen Figueroa 81 y o  male MRN: [id number]  Unit/Bed#: 3150 [facility] -01 Encounter: [id number]      Assessment/Plan  1  Dementia  Patient alert and oriented to person, knows he is at a hospital but thinks in [place], thinks it is April 1987  Dx by PCP 1/4/19  AAA referral was placed at this time  Will check B12, Folate  Patient's daughter reports she is trying to become POA, will pass information along to   The patient likely not safe to live at home alone any longer, will need long-term placement, daughter reports she may consider taking patient to [place] and have him live with her  Patient would benefit from formal cognitive assessment as outpatient, this be done at [facility] for positive aging upon discharge  Continue supportive care    2  Delirium  Patient was noted to have acute confusion upon arrival to the hospital, it improved once transferred to the ICU, though patient remains confused which appears to be at baseline for him  When patient able to take p  O , and liver enzymes improved, would recommend scheduled Tylenol 650-975 mg Q 8  Continue with oxycodone 2 5, 5 mg p r n  Q 4 for moderate, severe pain  Consider Lidoderm patch if applicable  Will defer bowel regimen to surgery team  Monitor closely for urinary retention, bladder scan and straight cath as needed   Patient at risk for developing delirium, delirium preventing tactics advised  Redirect unwanted patient behaviors as first line tx  Reorient patient frequently  Avoid deliriogenic meds including tramadol, benzodiazepines, benadryl  Good sleep hygiene important, limit night time interruptions  Encourage patient to stay awake during the day  Ensure adequate hydration/nutrition  Mobilize often   If patient becomes agitated/combative and cannot be redirected recommend zyprexa 2 5mg Q8     3   Hx of Fall   Fall precautions  Home meds unremarkable  PT, OT  Continue supportive care  Consider adding vitamin D3 1000 International Units daily patient's medication regimen    4  Deconditioning  Secondary to hospital stay, pending surgery  Mobilize frequently when appropriate prevent further decline  PT, OT  Patient will benefit from rehab to improve gait stability and strength, and upon discharge from rehab will need long-term placement versus living with family for 24/7 supervision    5  Hypoalbuminemia  Recommend Ensure added to diet b i d     6  FTT  Secondary to #1  Patient will need placement vs 24/7 supervision at home    7  Cholangitis  Surgery following and managing  OR today for lap josé luis      History of Present Illness   Physician Requesting Consult: Naila Saenz MD  Reason for Consult / Principal Problem: isar  Hx and PE limited by: n/a  HPI: Any Braswell is a 81y o  year old male with history of dementia, diagnosed by PCP on 01/04/2019, who presents to [facility] after neighbor called 911 concerned about patient's well being  Patient was found jaundiced, living in unsanitary living conditions covered in feces  Patient was sent to the emergency department, and was found to have cholangitis, he was admitted under the surgery team and sent to the ICU for close monitoring  Patient's mental status did improve upon arrival to the ICU, but he is generally alert and oriented to self only  PCP reported on 01/04/2019 that he felt patient had significant dementia and was concerned about patient's living environment  He made a referral to [facility] agency on Aging per his note in Care everywhere  PTA patient lived at home alone  Daughter was concerned patient lived alone, she stated his memory didn't seem good  Had a fall 1 month ago per daughter  Upon exam patient pleasant, but confused  Thinks he is in [place], at [facility] it is April 1987       Inpatient consult to Gerontology  Consult performed by: Dread Dickens PA-C  Consult ordered by: Rk Riggins MD          Review of Systems   Unable to perform ROS: Dementia       Historical Information   History reviewed  No pertinent past medical history  Past Surgical History:   Procedure Laterality Date    ERCP N/A 2/12/2019    Procedure: ENDOSCOPIC RETROGRADE CHOLANGIOPANCREATOGRAPHY (ERCP); Surgeon: Zarina Haines MD;  Location: [facility]; Service: Gastroenterology    TONSILLECTOMY       Social History   Social History     Substance and Sexual Activity   Alcohol Use Yes    Alcohol/week: 0 6 oz    Types: 1 Cans of beer per week    Frequency: Monthly or less    Drinks per session: 1 or 2    Binge frequency: Never     Social History     Substance and Sexual Activity   Drug Use Not on file     Social History     Tobacco Use   Smoking Status Never Smoker   Smokeless Tobacco Never Used         Family History: non-contributory    Meds/Allergies   Current meds:   Current Facility-Administered Medications   Medication Dose Route Frequency    heparin (porcine) subcutaneous injection 5,000 Units  5,000 Units Subcutaneous Q8H [address]    HYDROmorphone (DILAUDID) injection 0 2 mg  0 2 mg Intravenous Q4H PRN    multi-electrolyte (ISOLYTE-S PH 7 4 equivalent) IV solution  100 mL/hr Intravenous Continuous    piperacillin-tazobactam (ZOSYN) 4 5 g in sodium chloride 0 9 % 100 mL IVPB  4 5 g Intravenous Q6H      Current PTA meds:  No medications prior to admission  No Known Allergies    Objective   Vitals: Blood pressure 125/68, pulse 58, temperature 98 °F (36 7 °C), temperature source Oral, resp  rate (!) 23, height 5' 11" (1 803 m), weight 70 1 kg (154 lb 8 7 oz), SpO2 98 %  ,Body mass index is 21 55 kg/m²  Physical Exam   Constitutional: He appears well-developed and well-nourished  No distress  HENT:   Head: Normocephalic and atraumatic  Mouth/Throat: No oropharyngeal exudate  Eyes: Conjunctivae and EOM are normal  No scleral icterus  Neck: Neck supple  No thyromegaly present  Cardiovascular: Normal rate     Pulmonary/Chest: Effort normal and breath sounds normal  He has no wheezes  He has no rales  Abdominal: Soft  Bowel sounds are normal  He exhibits no distension  Musculoskeletal: Normal range of motion  He exhibits no edema  Neurological: He is alert  Skin: Skin is warm and dry  Psychiatric: He has a normal mood and affect  His behavior is normal  Thought content normal  He is not agitated and not actively hallucinating  Cognition and memory are impaired  He expresses impulsivity and inappropriate judgment  He exhibits abnormal recent memory and abnormal remote memory  Patient A*O x self, thinks he is in hospital in [place]  Thinks it is April 1987  Dementia at baseline  Was delirious upon arrival   Nursing note and vitals reviewed  Lab Results:   Results from last 7 days   Lab Units 02/13/19  0520   WBC Thousand/uL 13 27*   HEMOGLOBIN g/dL 10 3*   HEMATOCRIT % 32 0*   PLATELETS Thousands/uL 172        Results from last 7 days   Lab Units 02/13/19  0443   POTASSIUM mmol/L 4 5   CHLORIDE mmol/L 108   CO2 mmol/L 24   BUN mg/dL 21   CREATININE mg/dL 0 58*   CALCIUM mg/dL 8 1*   ALK PHOS U/L 163*   ALT U/L 74   AST U/L 49*       Imaging Studies: I have personally reviewed pertinent reports  EKG, Pathology, and Other Studies: I have personally reviewed pertinent reports      VTE Prophylaxis: Sequential compression device (Venodyne)     Code Status: Level 1 - Full Code

## 2019-02-13 NOTE — PROGRESS NOTES
02/13/19 1500   Spiritual Beliefs/Perceptions   Support Systems Children   Stress Factors   Patient Stress Factors None identified   Coping Responses   Patient Coping Accepting   Plan of Care   Comments Explored relational needs, listened empathically, provided a caring and supportive presence     Assessment Completed by: Unit visit

## 2019-02-13 NOTE — OP NOTE
OPERATIVE REPORT  PATIENT NAME: Christi Patton    :  1935  MRN: [id number]  Pt Location: [facility] OR ROOM 03    SURGERY DATE: 2019    Surgeon(s) and Role:     * Lillian Mckinney MD - Primary     * Vanna Payton MD - Karla Diaz MD - Assisting    Preop Diagnosis:  RUQ pain [R10 11]  Cholangitis [K83 09]    Post-Op Diagnosis Codes:     * RUQ pain [R10 11]     * Cholangitis [K83 09]    Procedure(s) (LRB):  CHOLECYSTECTOMY LAPAROSCOPIC (N/A)    Specimen(s):  ID Type Source Tests Collected by Time Destination   1 :  Tissue Gallbladder TISSUE EXAM Lillian Mckinney MD 2019 1301        Estimated Blood Loss:   225 mL    Drains:  * No LDAs found *    Anesthesia Type:   General    Operative Indications:  RUQ pain [R10 11]  Cholangitis [K83 09]      Operative Findings:    Cholecystitis acute to subacute         Complications:   None    Procedure and Technique:    The patient was taken to the operative suite And placed supine on the operating room table  General endotracheal anesthesia was induced  Patient's abdomen was prepped and draped into a sterile field  A time-out was performed and all in attendance agreed to the correct patient and procedure  The abdomen was entered using Frances technique supraumbilically  An 11 mm trocar was inserted after the fascia was divided  A 5 mm trocar was inserted in the subxiphoid region, in the right mid axillary line, and in the right midclavicular line  In the right upper quadrant the omentum was seen adherent to the liver working view of the gallbladder  This omentum was very thickened indurated and was retracted towards the patient's feet  This exposed the gallbladder which was dilated and hyperemic with inflammatory shell surrounding it  An aspiration needle was inserted into the gallbladder fundus and bile was aspirated to decompress the gallbladder  The gallbladder was then lifted cephalad    The posterior wall of the gallbladder was adhesed to the stomach and pylorus region and this was gently swept away with an atraumatic forcep  This exposed the infundibulum of the gallbladder  Peritoneum in this area near close node was opened and fatty inflamed tissue was bluntly dissected off the cystic duct  Posteriorly behind the cystic duct there was intense inflammatory process occurring  The cystic duct was circumferentially dissected in a 360 degree view of the cystic duct was obtained and it was entering only the gallbladder  A large tubular structure was seen running transversely at the bottom the field and this was named the common bile duct  Behind the cystic duct was another tubular structure that was densely adherent to the inflammatory process surrounding it  This structure was circumferentially dissected and was pulsatile and the artery  After a 360- degree view of this structure was obtained and seen to be entering only the gallbladder we felt comfortable clipping the cystic duct  The cystic duct was doubly clipped on the patient's side and singly clipped on the specimen side  It was divided with laparoscopic earline  Cystic artery was doubly clipped on the patient's side and singly clipped on the specimen side  After division, the 2 clips on the cystic duct on the patient's side migrated and fell off  This area was inspected and it it showed that the cystic duct was actually much smaller than the structure that we clipped  The structure that we clipped turned out to be 2/3 inflammatory rind and 1/3 cystic duct  The majority of the clip was actually on the inflammatory rind and with retraction on the gallbladder infundibulum a became loose and fell off  At this point we left the cystic duct stump at the bottom of the field with plans to return later in the surgery for definitive closure  We then began to dissect the gallbladder off the liver in a retrograde fashion    Due to the subacute nature of his cholecystitis the infundibular area was very inflamed and easy plane was difficult to find  The correct plane eventually was found and there was significant edema in this plane which actually made retrograde dissection somewhat easier than normal   About FCI up the liver during retrograde dissection the plane became obliterated in the posterior wall of the gallbladder was entered inadvertently with electrocautery and we did have bile spillage into the abdomen  The medial lateral peritoneal attachments to the gallbladder were then divided with electric Bovie electrocautery  We then attempted to dissect the gallbladder off the liver bed in an antegrade fashion starting with a dome down approach  We did find a nice edema plane at the fundal area however we did create a liver tear in the capsule due to the gallbladder being intrahepatic  We then held pressure on the liver capsule with the fundus of the gallbladder and proceeded with a retrograde dissection again  The gallbladder was then removed from liver bed in a retrograde fashion however a [address] of gallbladder mucosa and outer muscular layer was left on the liver bed this was an approximately 3 cm patch  At this point a large gallstone fell out of the posterior wall of the gallbladder into the abdomen  An Endo-Catch laparoscopic bag was inserted and the gallbladder and large stone was placed into the bag  The mucosal island that was left on the liver bed was then cauterized  Hemostasis was excellent  We then turned our attention to the cystic stump  The cystic stump duct was grasped and using a PDS endoloop it was snared and tied off using the endoloop at the cystic common bile duct junction being sure not to stenose the common bile duct  We then irrigated the gallbladder fossa and in Morison's pouch until all irrigation returned clear  The cystic artery staples and stump were then of again inspected and were found to be hemostatic and intact    The gallbladder was removed from the abdomen with the bag intact  Fascia was closed with figure of 8 interrupted 1  PDS sutures  The skin was closed with 4 Monocryl  Skin adhesive was applied       Dr Dorina Garcia  was present for the entire procedure    Patient Disposition:  PACU     SIGNATURE: Mickey Lucero MD  DATE: February 13, 2019  TIME: 2:08 PM

## 2019-02-13 NOTE — ANESTHESIA PREPROCEDURE EVALUATION
Review of Systems/Medical History  Patient summary reviewed        Cardiovascular   Pulmonary       GI/Hepatic            Endo/Other     GYN       Hematology   Musculoskeletal       Neurology   Psychology           Physical Exam    Airway    Mallampati score: II  TM Distance: >3 FB  Neck ROM: full     Dental       Cardiovascular      Pulmonary      Other Findings        Anesthesia Plan  ASA Score- 2     Anesthesia Type- general with ASA Monitors  Additional Monitors:   Airway Plan: ETT  Plan Factors-    Induction- intravenous  Postoperative Plan-     Informed Consent- Anesthetic plan and risks discussed with patient  I personally reviewed this patient with the CRNA  Discussed and agreed on the Anesthesia Plan with the CRNA  Lolly Barton

## 2019-02-13 NOTE — ANESTHESIA POSTPROCEDURE EVALUATION
Post-Op Assessment Note    CV Status:  Stable  Pain Score: 0    Pain management: satisfactory to patient     Mental Status:  Arousable and sleepy   Hydration Status:  Stable   PONV Controlled:  None   Airway Patency:  Patent   Post Op Vitals Reviewed: Yes      Staff: AnesthesiologistGUILLAUME           BP   146/68   Temp 97 5 °F (36 4 °C) (02/13/19 1337)    Pulse 77 (02/13/19 1337)   Resp 21 (02/13/19 1337)    SpO2   100

## 2019-02-13 NOTE — NUTRITION
If unable to safely advance PO diet in next 48 hrs then consider an alternate means of nutrition support and reconsult RD for recs  Monitor LFTs
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
## 2019-02-13 NOTE — PROGRESS NOTES
Gastroenterology Progress Note   - Karlie Cardenas 81 y o  male MRN: [id number]    Unit/Bed#: OR POOL Encounter: [id number]      Assessment and Plan:   Obstructive jaundice  32-year-old male patient with possible dementia found down at home presenting with cholecystitis  2 2 cm gallstone is at the neck of the gallbladder  He was reported to have confusion and a low-grade fever on admission of 100 3, WBC on admission was 14  His LFTs were abnormal, total bilirubin on admission was 7 6   ERCP was performed yesterday and he was found to have a stone and sludge that were removed  Currently his T bili is trending down or 3 19, he denies any abdominal pain, cholecystectomy will be performed today  From the GI perspective, will sign off, please call with questions      Subjective:   Patient seen and examined at the bed, denies any events overnight, currently is NPO route, denies nausea or vomiting, is passing gases but is not having bowel movements since yesterday, denies chest pain or shortness of breath, no abdominal pain, patient is not ambulating     Objective:     Vitals: Blood pressure 142/64, pulse 68, temperature 97 5 °F (36 4 °C), resp  rate 17, height 5' 11" (1 803 m), weight 70 1 kg (154 lb 8 7 oz), SpO2 92 %  ,Body mass index is 21 55 kg/m²  Intake/Output Summary (Last 24 hours) at 2/13/2019 1508  Last data filed at 2/13/2019 1328  Gross per 24 hour   Intake 3151 67 ml   Output 775 ml   Net 2376 67 ml       Physical Exam:   Physical Exam   Constitutional: He appears well-developed and well-nourished  HENT:   Head: Normocephalic and atraumatic  Mouth/Throat: Oropharynx is clear and moist    Eyes: EOM are normal  Scleral icterus is present  Neck: Normal range of motion  Neck supple  Cardiovascular: Normal rate  Pulmonary/Chest: Effort normal and breath sounds normal    Abdominal: Soft  Bowel sounds are normal  He exhibits no mass  There is no tenderness   There is no rebound, no guarding and no CVA tenderness  Neurological: He is alert  Skin: Skin is warm and dry  Nursing note and vitals reviewed  Invasive Devices     Peripheral Intravenous Line            Peripheral IV 02/12/19 Left Forearm less than 1 day                Lab Results:  Results from last 7 days   Lab Units 02/13/19  1434 02/13/19  0520   WBC Thousand/uL 12 63* 13 27*   HEMOGLOBIN g/dL 9 6* 10 3*   HEMATOCRIT % 28 8* 32 0*   PLATELETS Thousands/uL 178 172   NEUTROS PCT %  --  87*   LYMPHS PCT %  --  5*   MONOS PCT %  --  6   EOS PCT %  --  1     Results from last 7 days   Lab Units 02/13/19  0443   POTASSIUM mmol/L 4 5   CHLORIDE mmol/L 108   CO2 mmol/L 24   BUN mg/dL 21   CREATININE mg/dL 0 58*   CALCIUM mg/dL 8 1*   ALK PHOS U/L 163*   ALT U/L 74   AST U/L 49*     Results from last 7 days   Lab Units 02/12/19  0439   INR  1 15     Results from last 7 days   Lab Units 02/11/19  1604   LIPASE u/L 28*       Imaging Studies: I have personally reviewed pertinent imaging studies  Xr Chest 2 Views    Result Date: 2/11/2019  Impression: Minimal right base atelectasis  Workstation performed: [id number]     Ct Head Without Contrast    Result Date: 2/11/2019  Impression: No acute intracranial abnormality  Mild chronic small vessel ischemic changes and volume loss  Workstation performed: [id number]     Fl Ercp Biliary Only    Result Date: 2/12/2019  Impression: Choledocholithiasis with mild biliary ductal dilatation  Please see procedure note for further details  Workstation performed: [id number]     Us Right Upper Quadrant    Result Date: 2/11/2019  Impression: 2 2 cm gallbladder neck gallstone with gallbladder wall thickening and pericholecystic fluid, correlate for acute cholecystitis  9 mm common bile duct  Workstation performed: [id number]     Ct Abdomen Pelvis With Contrast    Result Date: 2/11/2019  Impression: Cholelithiasis with a distended gallbladder and mild diffuse wall thickening    The findings are suspicious for acute cholecystitis  Correlation with laboratory studies is recommended  A right upper quadrant ultrasound could be performed for further evaluation  No free air or free fluid   Workstation performed: [id number]

## 2019-02-13 NOTE — PROGRESS NOTES
Progress Note - General Surgery   Jalen Figueroa 81 y o  male MRN: [id number]  Unit/Bed#: CW -01 Encounter: [id number]    Assessment:  80 yo M with cholangitis and choledocholithiasis, now s/p ERCP w/ sphincterotomy & removal of CBD stone/sludge  Doing well  Tbili- 3 19 (From 5 4)    Plan:  OR lap josé luis today   NPO w/ IVF   Continue Zosyn 4 days post drainage   Can resume diet post op  PRN pain control  PT/OT/Dispo      Subjective/Objective   Chief Complaint:     Subjective: Feels well, no complaints  Denies pain  No N/V, tolerated clears last night s/p ERCP    Objective:     Blood pressure 134/59, pulse (!) 50, temperature 98 3 °F (36 8 °C), temperature source Oral, resp  rate 12, height 5' 11" (1 803 m), weight 70 1 kg (154 lb 8 7 oz), SpO2 100 %  ,Body mass index is 21 55 kg/m²        Intake/Output Summary (Last 24 hours) at 2/13/2019 0542  Last data filed at 2/13/2019 0400  Gross per 24 hour   Intake 3518 33 ml   Output 780 ml   Net 2738 33 ml       Invasive Devices     Peripheral Intravenous Line            Peripheral IV 02/11/19 Right Antecubital 1 day    Peripheral IV 02/12/19 Left Forearm less than 1 day                Physical Exam:   Gen: Awake and alert, pleasant, NAD  Cardio: RRR  Lungs: CTAB  Abd: Soft, non distended, non tender      Lab, Imaging and other studies:  CBC: No results found for: WBC, HGB, HCT, MCV, PLT, ADJUSTEDWBC, MCH, MCHC, RDW, MPV, NRBC, CMP:   Lab Results   Component Value Date    SODIUM 140 02/13/2019    K 4 5 02/13/2019     02/13/2019    CO2 24 02/13/2019    BUN 21 02/13/2019    CREATININE 0 58 (L) 02/13/2019    CALCIUM 8 1 (L) 02/13/2019    AST 49 (H) 02/13/2019    ALT 74 02/13/2019    ALKPHOS 163 (H) 02/13/2019    EGFR 95 02/13/2019   , Coagulation: No results found for: PT, INR, APTT  VTE Pharmacologic Prophylaxis: Heparin  VTE Mechanical Prophylaxis: sequential compression device

## 2019-02-14 LAB
ALBUMIN SERPL BCP-MCNC: 2.1 G/DL (ref 3.5–5)
ALP SERPL-CCNC: 150 U/L (ref 46–116)
ALT SERPL W P-5'-P-CCNC: 60 U/L (ref 12–78)
ANION GAP SERPL CALCULATED.3IONS-SCNC: 8 MMOL/L (ref 4–13)
AST SERPL W P-5'-P-CCNC: 54 U/L (ref 5–45)
BACTERIA BLD CULT: ABNORMAL
BACTERIA BLD CULT: ABNORMAL
BASOPHILS # BLD AUTO: 0.01 THOUSANDS/ΜL (ref 0–0.1)
BASOPHILS NFR BLD AUTO: 0 % (ref 0–1)
BILIRUB SERPL-MCNC: 2.1 MG/DL (ref 0.2–1)
BUN SERPL-MCNC: 15 MG/DL (ref 5–25)
CALCIUM SERPL-MCNC: 7.9 MG/DL (ref 8.3–10.1)
CHLORIDE SERPL-SCNC: 106 MMOL/L (ref 100–108)
CO2 SERPL-SCNC: 25 MMOL/L (ref 21–32)
CREAT SERPL-MCNC: 0.47 MG/DL (ref 0.6–1.3)
EOSINOPHIL # BLD AUTO: 0 THOUSAND/ΜL (ref 0–0.61)
EOSINOPHIL NFR BLD AUTO: 0 % (ref 0–6)
ERYTHROCYTE [DISTWIDTH] IN BLOOD BY AUTOMATED COUNT: 16.6 % (ref 11.6–15.1)
FOLATE SERPL-MCNC: 12.5 NG/ML (ref 3.1–17.5)
GFR SERPL CREATININE-BSD FRML MDRD: 104 ML/MIN/1.73SQ M
GLUCOSE SERPL-MCNC: 130 MG/DL (ref 65–140)
GRAM STN SPEC: ABNORMAL
GRAM STN SPEC: ABNORMAL
HCT VFR BLD AUTO: 28.3 % (ref 36.5–49.3)
HGB BLD-MCNC: 9.4 G/DL (ref 12–17)
IMM GRANULOCYTES # BLD AUTO: 0.1 THOUSAND/UL (ref 0–0.2)
IMM GRANULOCYTES NFR BLD AUTO: 1 % (ref 0–2)
LYMPHOCYTES # BLD AUTO: 0.5 THOUSANDS/ΜL (ref 0.6–4.47)
LYMPHOCYTES NFR BLD AUTO: 5 % (ref 14–44)
MCH RBC QN AUTO: 38.2 PG (ref 26.8–34.3)
MCHC RBC AUTO-ENTMCNC: 33.2 G/DL (ref 31.4–37.4)
MCV RBC AUTO: 115 FL (ref 82–98)
MONOCYTES # BLD AUTO: 0.64 THOUSAND/ΜL (ref 0.17–1.22)
MONOCYTES NFR BLD AUTO: 6 % (ref 4–12)
NEUTROPHILS # BLD AUTO: 9.3 THOUSANDS/ΜL (ref 1.85–7.62)
NEUTS SEG NFR BLD AUTO: 88 % (ref 43–75)
NRBC BLD AUTO-RTO: 0 /100 WBCS
PLATELET # BLD AUTO: 200 THOUSANDS/UL (ref 149–390)
PMV BLD AUTO: 11.4 FL (ref 8.9–12.7)
POTASSIUM SERPL-SCNC: 4 MMOL/L (ref 3.5–5.3)
PROT SERPL-MCNC: 5.9 G/DL (ref 6.4–8.2)
RBC # BLD AUTO: 2.46 MILLION/UL (ref 3.88–5.62)
SODIUM SERPL-SCNC: 139 MMOL/L (ref 136–145)
VIT B12 SERPL-MCNC: <60 PG/ML (ref 100–900)
WBC # BLD AUTO: 10.55 THOUSAND/UL (ref 4.31–10.16)

## 2019-02-14 PROCEDURE — G8988 SELF CARE GOAL STATUS: HCPCS

## 2019-02-14 PROCEDURE — 80053 COMPREHEN METABOLIC PANEL: CPT | Performed by: SURGERY

## 2019-02-14 PROCEDURE — G8987 SELF CARE CURRENT STATUS: HCPCS

## 2019-02-14 PROCEDURE — 97110 THERAPEUTIC EXERCISES: CPT

## 2019-02-14 PROCEDURE — G8979 MOBILITY GOAL STATUS: HCPCS

## 2019-02-14 PROCEDURE — 99232 SBSQ HOSP IP/OBS MODERATE 35: CPT | Performed by: PHYSICIAN ASSISTANT

## 2019-02-14 PROCEDURE — 97163 PT EVAL HIGH COMPLEX 45 MIN: CPT

## 2019-02-14 PROCEDURE — 82607 VITAMIN B-12: CPT | Performed by: STUDENT IN AN ORGANIZED HEALTH CARE EDUCATION/TRAINING PROGRAM

## 2019-02-14 PROCEDURE — 82746 ASSAY OF FOLIC ACID SERUM: CPT | Performed by: STUDENT IN AN ORGANIZED HEALTH CARE EDUCATION/TRAINING PROGRAM

## 2019-02-14 PROCEDURE — 85025 COMPLETE CBC W/AUTO DIFF WBC: CPT | Performed by: STUDENT IN AN ORGANIZED HEALTH CARE EDUCATION/TRAINING PROGRAM

## 2019-02-14 PROCEDURE — 97167 OT EVAL HIGH COMPLEX 60 MIN: CPT

## 2019-02-14 PROCEDURE — G8978 MOBILITY CURRENT STATUS: HCPCS

## 2019-02-14 RX ORDER — CYANOCOBALAMIN 1000 UG/ML
1000 INJECTION INTRAMUSCULAR; SUBCUTANEOUS EVERY OTHER DAY
Status: DISCONTINUED | OUTPATIENT
Start: 2019-02-15 | End: 2019-02-19 | Stop reason: HOSPADM

## 2019-02-14 RX ORDER — CHOLECALCIFEROL (VITAMIN D3) 125 MCG
2000 CAPSULE ORAL DAILY
Status: DISCONTINUED | OUTPATIENT
Start: 2019-02-14 | End: 2019-02-14

## 2019-02-14 RX ORDER — OLANZAPINE 10 MG/1
2.5 INJECTION, POWDER, LYOPHILIZED, FOR SOLUTION INTRAMUSCULAR EVERY 8 HOURS PRN
Status: DISCONTINUED | OUTPATIENT
Start: 2019-02-14 | End: 2019-02-19 | Stop reason: HOSPADM

## 2019-02-14 RX ADMIN — HEPARIN SODIUM 5000 UNITS: 5000 INJECTION INTRAVENOUS; SUBCUTANEOUS at 16:38

## 2019-02-14 RX ADMIN — PIPERACILLIN SODIUM,TAZOBACTAM SODIUM 4.5 G: 4; .5 INJECTION, POWDER, FOR SOLUTION INTRAVENOUS at 18:08

## 2019-02-14 RX ADMIN — ESCITALOPRAM OXALATE 10 MG: 10 TABLET ORAL at 09:37

## 2019-02-14 RX ADMIN — CYANOCOBALAMIN TAB 500 MCG 2000 MCG: 500 TAB at 09:37

## 2019-02-14 RX ADMIN — FOLIC ACID 1 MG: 5 INJECTION, SOLUTION INTRAMUSCULAR; INTRAVENOUS; SUBCUTANEOUS at 12:52

## 2019-02-14 RX ADMIN — HEPARIN SODIUM 5000 UNITS: 5000 INJECTION INTRAVENOUS; SUBCUTANEOUS at 21:47

## 2019-02-14 RX ADMIN — PIPERACILLIN SODIUM,TAZOBACTAM SODIUM 4.5 G: 4; .5 INJECTION, POWDER, FOR SOLUTION INTRAVENOUS at 04:45

## 2019-02-14 RX ADMIN — TAMSULOSIN HYDROCHLORIDE 0.4 MG: 0.4 CAPSULE ORAL at 18:08

## 2019-02-14 RX ADMIN — PIPERACILLIN SODIUM,TAZOBACTAM SODIUM 4.5 G: 4; .5 INJECTION, POWDER, FOR SOLUTION INTRAVENOUS at 13:53

## 2019-02-14 RX ADMIN — HEPARIN SODIUM 5000 UNITS: 5000 INJECTION INTRAVENOUS; SUBCUTANEOUS at 06:03

## 2019-02-14 NOTE — PLAN OF CARE
Problem: OCCUPATIONAL THERAPY ADULT  Goal: Performs self-care activities at highest level of function for planned discharge setting  See evaluation for individualized goals  Description  Treatment Interventions: ADL retraining, Functional transfer training, UE strengthening/ROM, Endurance training, Cognitive reorientation, Patient/family training, Equipment evaluation/education, Continued evaluation, Activityengagement  Equipment Recommended: Bedside commode       See flowsheet documentation for full assessment, interventions and recommendations  Note:   Limitation: Decreased ADL status, Decreased UE strength, Decreased Safe judgement during ADL, Decreased cognition, Decreased endurance, Decreased self-care trans, Decreased high-level ADLs  Prognosis: Good  Assessment: Pt is a 81 y o  male who was admitted to [facility] on 2/11/2019 with Cholangitis; pt found confused and covered in feces  Pt now s/p ERCP and lap cholecystectomy  Pt's problem list also includes PMH of delirium, sepsis, altered mental status, cholecystistis, possible dementia  At baseline pt reports completing ADLs IND; pt reports his neighbor assists with house maintenance  Pt lives alone in a 2 SH w/ 8 KARRIE; bedroom on second floor  Pt denies use of AD/DME PTA  Pt reports he has a daughter that lives in [place] and receives no other family assist; however, brother that lives locally was present at end of session - pt is a poor historian, need to clarify  Currently pt requires MIN A for overall ADLS and for functional mobility/transfers  Pt with cognitive deficits; disoriented to time, grossly oriented to place [facility]") only when given choices  Pt requiring step by step directives & tactile cues to complete ADLs/transfers and frequent redirection to task  Pt also displays decreased insight into deficits; however reports he wants to get stronger   Pt currently presents with impairments in the following categories -steps to enter environment, limited home support, difficulty performing ADLS, difficulty performing IADLS  and limited insight into deficits activity tolerance, standing balance/tolerance, sitting balance/tolerance, UE strength, memory, insight, safety , judgement , attention  and sequencing   These impairments, as well as pt's decreased caregiver support, risk for falls and home environment  limit pt's ability to safely engage in all baseline areas of occupation, includinggrooming, bathing, dressing, toileting and functional mobility/transfers  From OT standpoint, recommend SHORT TERM REHAB upon D/C  OT will continue to follow to address the below stated goals        OT Discharge Recommendation: Short Term Rehab  OT - OK to Discharge: Yes(when medically appropriate)

## 2019-02-14 NOTE — OCCUPATIONAL THERAPY NOTE
[address]  Evaluation     Patient Name: Dre Hammond  NGTQW'N Date: 2/14/2019  Problem List  Patient Active Problem List   Diagnosis    Cholangitis    Hyperbilirubinemia    Cholecystitis    Altered mental status    RUQ pain    Transaminitis    Severe sepsis ([address] )    Dementia    Delirium    Hx of fall    Physical deconditioning    Hypoalbuminemia     Past Medical History  History reviewed  No pertinent past medical history  Past Surgical History  Past Surgical History:   Procedure Laterality Date    ERCP N/A 2/12/2019    Procedure: ENDOSCOPIC RETROGRADE CHOLANGIOPANCREATOGRAPHY (ERCP); Surgeon: Mendel Vargas MD;  Location: [facility]; Service: Gastroenterology    TONSILLECTOMY             02/14/19 4772   Note Type   Note type Eval/Treat   Restrictions/Precautions   Weight Bearing Precautions Per Order No   Other Precautions 1:1;Cognitive;Multiple lines; Fall Risk   Pain Assessment   Pain Assessment 0-10   Pain Score No Pain   Home Living   Type of Home House   Home Layout Two level;Stairs to enter with rails  (8 [address]; bedroom upstairs)   Bathroom Shower/Tub Tub/shower unit   Bathroom Toilet Standard   Additional Comments Pt denies use of any AD/DME PTA  Pt is a poor historian - need to clarify   Prior Function   Level of [place] Independent with ADLs and functional mobility   Lives With Alone   Receives Help From Family; Neighbor   ADL Assistance Independent   IADLs Independent  (Unclear - pt lives alone, neighbor assists house maintenance)   Falls in the last 6 months 0  (Reports 1 6-7 months ago)   Vocational Retired   Comments Pt reports only having dtr that lives in [place]; however pt's brother that lives local present at end of session - need to clarify   Lifestyle   Autonomy Pt reports IND all ADLs and functional mobility w/o an AD  IADLs unclear - reports neighbor assists house maintenance     Reciprocal Relationships supportive family   Service to Others retired   Intrinsic Gratification Pt reports he is inactive; used to enjoy boxing, soccer, football   Psychosocial   Psychosocial (WDL) WDL   Subjective   Subjective "I need to do more exercises"   ADL   Eating Assistance 5  [address]  Minimal Assistance    [address] Deficit Steadying;Verbal cueing;Supervision/safety; Increased time to complete  (Pt donned/doffed socks - required VCs and TCs to sequence )   Toileting Assistance  4  Minimal Assistance   Bed Mobility   Supine to Sit 4  Minimal assistance   Additional items Assist x 1; Increased time required   Transfers   Sit to Stand 4  Minimal assistance   Additional items Assist x 1; Increased time required;Verbal cues   Stand to Sit 4  Minimal assistance   Additional items Assist x 1; Increased time required;Verbal cues   Additional Comments VCs and TCs for correct hand placement and transfer strategy w/ RW   Functional Mobility   Functional Mobility 4  Minimal assistance   Additional Comments CGA  household distances   Additional items Rolling walker   Balance   Static Sitting Fair   Dynamic Sitting Fair -   Static Standing Fair -   Dynamic Standing Poor +   Activity Tolerance   Activity Tolerance Patient limited by fatigue   Medical Staff Made Aware PT Mary Alice Staff   Nurse Made Aware Spoke to RN - pt appropriate to be seen   RUE Assessment   RUE Assessment WFL  (grossly 4/5; triceps 4-/5)   LUE Assessment   LUE Assessment WFL  (grossly 4/5; triceps 4-/5)   Hand Function   Gross Motor Coordination Functional   Fine Motor Coordination Functional   Sensation   Light Touch No apparent deficits   Cognition   Overall Cognitive Status Impaired   Arousal/Participation Alert; Cooperative   Attention Difficulty attending to directions  (required frequent redirection)   Orientation Level Oriented to person;Oriented to situation;Disoriented to place; Disoriented to time  (Grossly oriented place when given choices only)   Memory Decreased recall of biographical information;Decreased short term memory;Decreased recall of recent events   Following Commands Follows one step commands with increased time or repetition   Assessment   Limitation Decreased ADL status; Decreased UE strength;Decreased Safe judgement during ADL;Decreased cognition;Decreased endurance;Decreased self-care trans;Decreased high-level ADLs   Prognosis Good   Assessment Pt is a 81 y o  male who was [facility] 2/11/2019 with Cholangitis; pt found confused and covered in feces  Pt now s/p ERCP and lap cholecystectomy  Pt's problem list also includes PMH of delirium, sepsis, altered mental status, cholecystistis, possible dementia  At baseline pt reports completing ADLs IND; pt reports his neighbor assists with house maintenance  Pt lives alone in a 2 SH w/ 8 [address]; bedroom on second floor  Pt denies use of AD/DME PTA  Pt reports he has a daughter that lives in [place] and receives no other family assist; however, brother that lives locally was present at end of session - pt is a poor historian, need to clarify  Currently pt requires MIN A for overall ADLS and for functional mobility/transfers  Pt with cognitive deficits; disoriented to time, grossly oriented to place [facility]") only when given choices  Pt requiring step by step directives & tactile cues to complete ADLs/transfers and frequent redirection to task  Pt also displays decreased insight into deficits; however reports he wants to get stronger   Pt currently presents with impairments in the following categories -steps to enter environment, limited home support, difficulty performing ADLS, difficulty performing IADLS  and limited insight into deficits activity tolerance, standing balance/tolerance, sitting balance/tolerance, UE strength, memory, insight, safety , judgement , attention  and sequencing   These impairments, as well as pt's decreased caregiver support, risk for falls and home environment  limit pt's ability to safely engage in all baseline areas of occupation, includinggrooming, bathing, dressing, toileting and functional mobility/transfers  From OT standpoint, recommend SHORT TERM REHAB upon D/C  OT will continue to follow to address the below stated goals  Goals   Patient Goals to get stronger   LTG Time Frame 10-14   Long Term Goal #1 see goals below   Plan   Treatment Interventions ADL retraining;Functional transfer training;UE strengthening/ROM; Endurance training;Cognitive reorientation;Patient/family training;Equipment evaluation/education;Continued evaluation; Activityengagement   Goal Expiration Date 02/28/19   OT Frequency 3-5x/wk   Recommendation   OT Discharge Recommendation Short Term Rehab   Equipment Recommended Bedside commode   OT - OK to Discharge Yes  (when medically appropriate)   Barthel Index   Feeding 10   Bathing 0   Grooming Score 0   Dressing Score 5   Bladder Score 10   Bowels Score 10   Toilet Use Score 5   Transfers (Bed/Chair) Score 10   Mobility (Level Surface) Score 0   Stairs Score 0   Barthel Index Score 50          Goals:  MOD IND toileting and toilet transfers with use of AD as needed  MOD IND LB dressing/bathing with use of AD as needed  MOD IND UB dressing/bathing with Fair+ to Good sitting balance and use of AD as needed  MOD IND functional transfers/mobility to all surfaces with Fair+ to Good balance/safety to participate in dynamic ADLs  Good carryover w/ safe use of RW during functional tasks  Assess DME needs  Increase activity tolerance to 30-35 minutes for participation in ADLs  Participate in ongoing functional cognitive assessment w/ good attention/participation to assist w/ safe D/C recommendation  Pt to consistently demo A&Ox4 w/o use of VCs      Increase B/L UE strength by 1/2 grade to improve participation in functional transfers/UB ADLs        Latoya Naqvi, OT

## 2019-02-14 NOTE — PLAN OF CARE
Problem: PHYSICAL THERAPY ADULT  Goal: Performs mobility at highest level of function for planned discharge setting  See evaluation for individualized goals  Description  Treatment/Interventions: Functional transfer training, LE strengthening/ROM, Elevations, Therapeutic exercise, Endurance training, Patient/family training, Equipment eval/education, Bed mobility, Gait training, Spoke to nursing, OT, Family  Equipment Recommended: Walker(RW)       See flowsheet documentation for full assessment, interventions and recommendations  Note:   Prognosis: Good  Problem List: Decreased strength, Decreased endurance, Impaired balance, Decreased mobility, Decreased cognition, Impaired judgement, Decreased safety awareness  Assessment: Pt is 81 y o  male seen for PT evaluation s/p admit to [facility] on [facility]/11/2019  Two pt identifiers were used to confirm  Pt presented w/ jaundice and confusion  Pt was admitted with a primary dx of: cholangitis  Pt underwent ERCP which was performed on 2/12/2019, and CHOLECYSTECTOMY LAPAROSCOPIC which was performed on 2/13/2019  PT now consulted for assessment of mobility and d/c needs  Pt with Up with assistance orders  Pts current co morbidities effecting treatment include: no PMH on record and personal factors including living alone, KARRIE home and steps to manage in the home  Pts current clinical presentation is Unstable/ Unpredictable (high complexity) due to Ongoing medical management for primary dx, Increased reliance on more restrictive AD compared to baseline, Decreased activity tolerance compared to baseline, Fall risk, Increased assistance needed from caregiver at current time, Trending lab values, Continuous pulse oximetry monitoring     Prior to admission, pt was I with ambulation without the use of an AD  Upon evaluation, pt currently is requiring min A for bed mobility; min A for transfers and min A for ambulation w/ RW    Pt denies any lightheadedness or dizziness with ambulation  Pt presents at PT eval functioning below baseline and currently w/ overall mobility deficits 2* to: BLE weakness, impaired balance, decreased endurance, gait deviations, decreased activity tolerance compared to baseline, decreased safety awareness, impaired judgement, fall risk, decreased cognition  Pt currently at a fall risk 2* to impairments listed above  Based on the aforementioned PT evaluation, pt will continue to benefit from skilled Acute PT interventions to address stated impairments; to maximize functional mobility; for ongoing pt/ family training; and DME needs  At conclusion of PT session pt returned back in chair with phone and call bell within reach  PCA for 1:1 present and conclusion of PT session Pt denies any further questions at this time  PT is currently recommending rehab due to decreased functional mobility compared to baseline and increased A needed from caregiver at current time  Pt/ family agreeable to plan and goals as stated on evaluation  PT will continue to follow during hospital stay  Barriers to Discharge: Decreased caregiver support  Barriers to Discharge Comments: Pt lives alone   Recommendation: Short-term skilled PT     PT - OK to Discharge: Yes(to rehab when medically cleared )    See flowsheet documentation for full assessment

## 2019-02-14 NOTE — PROGRESS NOTES
Nurse / Provider rounds completed with staff nurseAbdulkadir and patient  Very impulsive and therefore 1:1 reordered for patient safety

## 2019-02-14 NOTE — SOCIAL WORK
Pt confused  CM phoned pt's dtr Judah Nievestead ([phone]) to complete CM assessment/discuss dcp  Pt's dtr reports she is currently on her way to the airport and will be on-site at [facility] tomorrow 2/15  Pt's dtr requesting to meet in person to complete CM assessment and discuss dcp  CM will f/u

## 2019-02-14 NOTE — PROGRESS NOTES
Progress Note - Alexandria Valentino 81 y o  male MRN: [id number]    Unit/Bed#: [facility] 918-01 Encounter: [id number]      Assessment/Plan  1  Dementia  Patient was A*O x self, says "I never know where I am"  Dx by PCP 1/4/19  AAA referral was placed at this time  Vitamin B12 very low, will order replacement --- VITAMIN B12 1000 IM EVERY OTHER DAY FOR NEXT WEEK, then weekly for 4-8 weeks, then will need vitamin B12 1000 mcg IM injected every month for life  Folate 12 5, continue   Patient's daughter reports she is trying to become POA, will pass information along to   The patient likely not safe to live at home alone any longer, will need long-term placement, daughter reports she may consider taking patient to [place] and have him live with her  Patient would benefit from formal cognitive assessment as outpatient, [facility]  Continue supportive care     2  Delirium  Patient continues to be impulsive, is now on 1:1  Has not needed antipsychotics  Tylenol 650-975 mg Q 8  Continue with oxycodone 2 5, 5 mg p r n  Q 4 for moderate, severe pain  Consider Lidoderm patch if applicable  Will defer bowel regimen to surgery team  Monitor closely for urinary retention, bladder scan and straight cath as needed   Patient at risk for developing delirium, delirium preventing tactics advised  Redirect unwanted patient behaviors as first line tx  Reorient patient frequently  Avoid deliriogenic meds including tramadol, benzodiazepines, benadryl  Good sleep hygiene important, limit night time interruptions  Encourage patient to stay awake during the day  Ensure adequate hydration/nutrition  Mobilize often   If patient becomes agitated/combative and cannot be redirected recommend zyprexa 2 5mg Q8      3   Hx of Fall   Recommendations remain the same  Fall precautions  Home meds unremarkable  PT, OT  Continue supportive care  Consider adding vitamin D3 1000 International Units daily patient's medication regimen     4  Deconditioning  Recommendations remain  Secondary to hospital stay, pending surgery  Mobilize frequently when appropriate prevent further decline  PT, OT  Patient will benefit from rehab to improve gait stability and strength, and upon discharge from rehab will need long-term placement versus living with family for 24/7 supervision     5  Hypoalbuminemia  Recommendations remain the same  Recommend Ensure added to diet b i d      6  FTT  Secondary to #1  Patient will need placement vs 24/7 supervision at home     7  Cholangitis  Surgery following and managing  OR yesterday       Subjective:   Patient impulsive, continues to be on one-to-one  Had surgery yesterday  Patient denies complaints, does not know where he is and reports he never knows where he is  His vitamin B12 is so low does not register in the lab  He will need to be on B12 injections, as stated above, red surgery aware  Pertinent review of systems cannot be gathered secondary to patient's cognitive impairment  Objective:     Vitals: Blood pressure 132/76, pulse 68, temperature 98 8 °F (37 1 °C), temperature source Oral, resp  rate 18, height 5' 11" (1 803 m), weight 70 1 kg (154 lb 8 7 oz), SpO2 94 %  ,Body mass index is 21 55 kg/m²  Intake/Output Summary (Last 24 hours) at 2/14/2019 1333  Last data filed at 2/14/2019 1100  Gross per 24 hour   Intake 2296 67 ml   Output 1200 ml   Net 1096 67 ml       Physical Exam: General : WD in NAD  HEENT : MMM no erythema or exudates  EOMI, sclera anicteric  Heart : Normal rate  Lungs : CTA  Abdomen : Soft, NT, mildly distended, BS auscultated in all 4 quads  No organomegaly  Ext :  Pulses +2/4 B/L  Neg edema B/L   Neg calf swelling B/L  Skin : Pink, warm, dry, age appropriate turgor and mobility  Neuro : Nonfocal  Psych : A * O x self, +dementia, impulsive, +delirium       Invasive Devices     Peripheral Intravenous Line            Peripheral IV 02/13/19 Distal;Left Forearm less than 1 day                Lab, Imaging and other studies: I have personally reviewed pertinent reports      VTE Pharmacologic Prophylaxis: Sequential compression device (Venodyne)   VTE Mechanical Prophylaxis: sequential compression device

## 2019-02-14 NOTE — PROGRESS NOTES
Progress Note - General Surgery   Luh Serve 81 y o  male MRN: [id number]  Unit/Bed#: [facility] 918-01 Encounter: [id number]    Assessment:  81 M with cholangitis and necrotic cholecystitis  S/p ERCP and lap cholecystectomy  Surgically doing well with acute delirium on existing dementia    Plan:  Diet as tolerated  DC IVF  4 days antibiotics post ERCP drainage  Will try being off 1:1 today  PT and OT evals    Subjective/Objective   Chief Complaint:     Subjective: was very confused post op and trying to get out of bed, 1:1 ordered  Very talkative and didn't sleep much overnight  Objective:     Blood pressure 127/62, pulse 65, temperature 98 6 °F (37 °C), resp  rate 20, height 5' 11" (1 803 m), weight 70 1 kg (154 lb 8 7 oz), SpO2 93 %  ,Body mass index is 21 55 kg/m²  Intake/Output Summary (Last 24 hours) at 2/14/2019 0733  Last data filed at 2/14/2019 0300  Gross per 24 hour   Intake 2893 34 ml   Output 725 ml   Net 2168 34 ml       Invasive Devices     Peripheral Intravenous Line            Peripheral IV 02/13/19 Distal;Left Forearm less than 1 day                Physical Exam:   Gen: Alert, not oriented, NAD  Cardio: RRR  Lungs: CTAB  Abd: Soft, non distended, non tender   Incisions clean    Lab, Imaging and other studies:  CBC:   Lab Results   Component Value Date    WBC 10 55 (H) 02/14/2019    HGB 9 4 (L) 02/14/2019    HCT 28 3 (L) 02/14/2019     (H) 02/14/2019     02/14/2019    MCH 38 2 (H) 02/14/2019    MCHC 33 2 02/14/2019    RDW 16 6 (H) 02/14/2019    MPV 11 4 02/14/2019    NRBC 0 02/14/2019   , CMP:   Lab Results   Component Value Date    SODIUM 139 02/14/2019    K 4 0 02/14/2019     02/14/2019    CO2 25 02/14/2019    BUN 15 02/14/2019    CREATININE 0 47 (L) 02/14/2019    CALCIUM 7 9 (L) 02/14/2019    AST 54 (H) 02/14/2019    ALT 60 02/14/2019    ALKPHOS 150 (H) 02/14/2019    EGFR 104 02/14/2019   , Coagulation: No results found for: PT, INR, APTT  VTE Pharmacologic Prophylaxis: Heparin  VTE Mechanical Prophylaxis: sequential compression device

## 2019-02-14 NOTE — PHYSICAL THERAPY NOTE
PHYSICAL THERAPY EVALUATION  NAME:  Nadiya Flemign  DATE: 02/14/19    AGE:   81 y o  Mrn:   [id number]  ADMIT DX:  Cholangitis [K83 09]  Hyperbilirubinemia [E80 6]  Cholecystitis [K81 9]  Altered mental status [R41 82]  RUQ pain [R10 11]  Transaminitis [R74 0]    History reviewed  No pertinent past medical history  Past Surgical History:   Procedure Laterality Date    ERCP N/A 2/12/2019    Procedure: ENDOSCOPIC RETROGRADE CHOLANGIOPANCREATOGRAPHY (ERCP); Surgeon: Fletcher Valerio MD;  [facility]: [facility]; Service: Gastroenterology    TONSILLECTOMY         Length Of Stay: 3    PHYSICAL THERAPY EVALUATION:        02/14/19 0900   Note Type   Note type Eval only   Pain Assessment   Pain Assessment No/denies pain   Home Living   Type of [address] [place] [address] Two level;Stairs to enter with rails  (8 [address]AR[address], bedroom upstairs)   Home Equipment   (none as per pt)   Additional Comments Pt reports living alone and reports only having a daughter who lives in [place], however Pts Brother who is local was present in room at conclusion of PT eval  Need to clarify pts living situation  Pt poor historian at current time   Prior Function   Level of [place] Independent with ADLs and functional mobility   Lives With Alone   Receives Help From Family;Friend(s)   ADL Assistance Independent   Falls in the last 6 months 0   Vocational Retired   Comments Pt denies the use of an AD for ambulation PTA  Need to clarify  Pt poor historian    Restrictions/Precautions   Weight Bearing Precautions Per Order No   Other Precautions 1:1;Cognitive;Multiple lines;Pain; Fall Risk   General   Family/Caregiver Present Yes   Cognition   Overall Cognitive Status Impaired   Arousal/Participation Responsive   Orientation Level Oriented to person;Oriented to situation   Memory Decreased recall of recent events;Decreased short term memory   Following Commands Follows one step commands with increased time or repetition   RUE Assessment   RUE Assessment [facility] LUE Assessment   LUE Assessment WFL   RLE Assessment   RLE Assessment WFL   Strength RLE   RLE Overall Strength 4/5   LLE Assessment   LLE Assessment WFL   Strength LLE   LLE Overall Strength 4/5   Bed Mobility   Supine to Sit 4  Minimal assistance   Additional items Assist x 1; Increased time required;Verbal cues   Transfers   Sit to Stand 4  Minimal assistance   Additional items Assist x 1; Increased time required;Verbal cues   Stand to Sit 4  Minimal assistance   Additional items Assist x 1; Increased time required;Verbal cues   Additional Comments VC and TC needed for hand placement and safety    Ambulation/Elevation   Gait pattern Excessively slow; Short stride; Foward flexed; Inconsistent jamar   Gait Assistance 4  Minimal assist   Additional items Assist x 1   Assistive Device Rolling walker   Distance 40ft x 2    Balance   Static Sitting Fair   Static Standing Fair -   Ambulatory Poor +   Endurance Deficit   Endurance Deficit Yes   Endurance Deficit Description fatigue    Activity Tolerance   Activity Tolerance Patient limited by fatigue   Nurse Made Aware Pt appropriate to be seen and mobilize per nsg    Assessment   Prognosis Good   Problem List Decreased strength;Decreased endurance; Impaired balance;Decreased mobility; Decreased cognition; Impaired judgement;Decreased safety awareness   Assessment Pt is 81 y o  male seen for PT evaluation s/p admit to [facility] on 2/11/2019  Two pt identifiers were used to confirm  Pt presented w/ jaundice and confusion  Pt was admitted with a primary dx of: cholangitis  Pt underwent ERCP which was performed on 2/12/2019, and CHOLECYSTECTOMY LAPAROSCOPIC which was performed on 2/13/2019  PT now consulted for assessment of mobility and d/c needs  Pt with Up with assistance orders  Pts current co morbidities effecting treatment include: no PMH on record and personal factors including living alone, KARRIE home and steps to manage in the home   Pts current clinical presentation is Unstable/ Unpredictable (high complexity) due to Ongoing medical management for primary dx, Increased reliance on more restrictive AD compared to baseline, Decreased activity tolerance compared to baseline, Fall risk, Increased assistance needed from caregiver at current time, Trending lab values, Continuous pulse oximetry monitoring     Prior to admission, pt was I with ambulation without the use of an AD  Upon evaluation, pt currently is requiring min A for bed mobility; min A for transfers and min A for ambulation w/ RW   Pt denies any lightheadedness or dizziness with ambulation  Pt presents at PT eval functioning below baseline and currently w/ overall mobility deficits 2* to: BLE weakness, impaired balance, decreased endurance, gait deviations, decreased activity tolerance compared to baseline, decreased safety awareness, impaired judgement, fall risk, decreased cognition  Pt currently at a fall risk 2* to impairments listed above  Based on the aforementioned PT evaluation, pt will continue to benefit from skilled Acute PT interventions to address stated impairments; to maximize functional mobility; for ongoing pt/ family training; and DME needs  At conclusion of PT session pt returned back in chair with phone and call bell within reach  PCA for 1:1 present and conclusion of PT session Pt denies any further questions at this time  PT is currently recommending rehab due to decreased functional mobility compared to baseline and increased A needed from caregiver at current time  Pt/ family agreeable to plan and goals as stated on evaluation  PT will continue to follow during hospital stay     Barriers to Discharge Decreased caregiver support   Barriers to Discharge Comments Pt lives alone    Goals   Patient Goals " to get better"   STG Expiration Date 02/24/19   Short Term Goal #1 In 10 days pt will complete: 1) Bed mobility skills with mod I to increase safety and independence as well as decrease caregiver burden  2) Functional transfers with mod I to promote increased independence, safety, and QOL in the home environment  3) Ambulate 200' using least restrictive AD with S without LOB and stable vitals so that pt can negotiate home environment safely and promote independence with functional mobility and return to PLOF  4) Stair training up/ down 10 step/s using rail/s with S so that pt can enter/negotiate home environment safely and decrease fall risk  5) Improve balance grades to Good to increase safety with all mobility and decrease fall risk  6) Improve BLE strength by 1/2 grade to help increase overall functional mobility and decrease fall risk  7) PT for ongoing pt and family education; DME needs and D/C planning to promote highest level of function in least restrictive environment  Treatment Day 1   Plan   Treatment/Interventions Functional transfer training;LE strengthening/ROM; Elevations; Therapeutic exercise; Endurance training;Patient/family training;Equipment eval/education; Bed mobility;Gait training;Spoke to nursing;OT;Family   PT Frequency Other (Comment)  (3-5x a week )   Recommendation   Recommendation Short-term skilled PT   Equipment Recommended Walker  (RW)   PT - OK to Discharge Yes  (to rehab when medically cleared )   Modified Rutland Scale   Modified Ernesto Scale 4   Barthel Index   Feeding 10   Bathing 0   Grooming Score 5   Dressing Score 5   Bladder Score 10   Bowels Score 10   Toilet Use Score 5   Transfers (Bed/Chair) Score 10   Mobility (Level Surface) Score 0   Stairs Score 0   Barthel Index Score 55   Tx Note:  Time In: 848  Time Out:900  Total Time:12min  S: Pt willing to participate in PT session post PT eval  O: Pt performed LAQ, Hip abduction, Marches, Glute sets 2 sets of 10 reps for each exercise  A: Pt agreeable to take part in PT treatment following evaluation   PT treatment to follow to facilitate therex due to strength deficits/ functional mobility deficits noted during evaluation  Pt was able to perform LAQ, Hip abduction, Marches, [employer] sets  All exercises were performed 2 sets of 10 reps  Pt continues to require VC for proper form and pacing with all exercises  Pt denies any new complaints with therex  At conclusion of PT session pt returned back in chair with all needs within reach  PCA for 1:1 present at conclusion of PT session  Pt denies any further questions at this time  PT will continue to follow  D/C recommendation when medically cleared is rehab due to decreased functional mobility compared to baseline    P: Continue with POC as outlined in pts initial evaluation    Celena Castillo, PT

## 2019-02-15 LAB
ABO GROUP BLD BPU: NORMAL
ABO GROUP BLD BPU: NORMAL
ALBUMIN SERPL BCP-MCNC: 2.1 G/DL (ref 3.5–5)
ALP SERPL-CCNC: 133 U/L (ref 46–116)
ALT SERPL W P-5'-P-CCNC: 52 U/L (ref 12–78)
ANION GAP SERPL CALCULATED.3IONS-SCNC: 5 MMOL/L (ref 4–13)
ANISOCYTOSIS BLD QL SMEAR: PRESENT
AST SERPL W P-5'-P-CCNC: 39 U/L (ref 5–45)
BASOPHILS # BLD MANUAL: 0 THOUSAND/UL (ref 0–0.1)
BASOPHILS NFR MAR MANUAL: 0 % (ref 0–1)
BILIRUB SERPL-MCNC: 1.38 MG/DL (ref 0.2–1)
BPU ID: NORMAL
BPU ID: NORMAL
BUN SERPL-MCNC: 14 MG/DL (ref 5–25)
CALCIUM SERPL-MCNC: 7.8 MG/DL (ref 8.3–10.1)
CHLORIDE SERPL-SCNC: 105 MMOL/L (ref 100–108)
CO2 SERPL-SCNC: 28 MMOL/L (ref 21–32)
CREAT SERPL-MCNC: 0.51 MG/DL (ref 0.6–1.3)
CROSSMATCH: NORMAL
CROSSMATCH: NORMAL
EOSINOPHIL # BLD MANUAL: 0.08 THOUSAND/UL (ref 0–0.4)
EOSINOPHIL NFR BLD MANUAL: 1 % (ref 0–6)
ERYTHROCYTE [DISTWIDTH] IN BLOOD BY AUTOMATED COUNT: 16.4 % (ref 11.6–15.1)
GFR SERPL CREATININE-BSD FRML MDRD: 100 ML/MIN/1.73SQ M
GLUCOSE SERPL-MCNC: 118 MG/DL (ref 65–140)
HCT VFR BLD AUTO: 25.5 % (ref 36.5–49.3)
HGB BLD-MCNC: 8.6 G/DL (ref 12–17)
LYMPHOCYTES # BLD AUTO: 0.46 THOUSAND/UL (ref 0.6–4.47)
LYMPHOCYTES # BLD AUTO: 6 % (ref 14–44)
MACROCYTES BLD QL AUTO: PRESENT
MCH RBC QN AUTO: 38.1 PG (ref 26.8–34.3)
MCHC RBC AUTO-ENTMCNC: 33.7 G/DL (ref 31.4–37.4)
MCV RBC AUTO: 113 FL (ref 82–98)
MONOCYTES # BLD AUTO: 0.31 THOUSAND/UL (ref 0–1.22)
MONOCYTES NFR BLD: 4 % (ref 4–12)
MYELOCYTES NFR BLD MANUAL: 1 % (ref 0–1)
NEUTROPHILS # BLD MANUAL: 6.8 THOUSAND/UL (ref 1.85–7.62)
NEUTS SEG NFR BLD AUTO: 88 % (ref 43–75)
NRBC BLD AUTO-RTO: 0 /100 WBCS
PLATELET # BLD AUTO: 232 THOUSANDS/UL (ref 149–390)
PLATELET BLD QL SMEAR: ADEQUATE
PMV BLD AUTO: 10.3 FL (ref 8.9–12.7)
POIKILOCYTOSIS BLD QL SMEAR: PRESENT
POLYCHROMASIA BLD QL SMEAR: PRESENT
POTASSIUM SERPL-SCNC: 3.7 MMOL/L (ref 3.5–5.3)
PROT SERPL-MCNC: 5.4 G/DL (ref 6.4–8.2)
RBC # BLD AUTO: 2.26 MILLION/UL (ref 3.88–5.62)
RBC MORPH BLD: PRESENT
SODIUM SERPL-SCNC: 138 MMOL/L (ref 136–145)
UNIT DISPENSE STATUS: NORMAL
UNIT DISPENSE STATUS: NORMAL
UNIT PRODUCT CODE: NORMAL
UNIT PRODUCT CODE: NORMAL
UNIT RH: NORMAL
UNIT RH: NORMAL
WBC # BLD AUTO: 7.73 THOUSAND/UL (ref 4.31–10.16)

## 2019-02-15 PROCEDURE — 97530 THERAPEUTIC ACTIVITIES: CPT

## 2019-02-15 PROCEDURE — 85027 COMPLETE CBC AUTOMATED: CPT | Performed by: SURGERY

## 2019-02-15 PROCEDURE — 85007 BL SMEAR W/DIFF WBC COUNT: CPT | Performed by: SURGERY

## 2019-02-15 PROCEDURE — 80053 COMPREHEN METABOLIC PANEL: CPT | Performed by: SURGERY

## 2019-02-15 PROCEDURE — 99024 POSTOP FOLLOW-UP VISIT: CPT | Performed by: SURGERY

## 2019-02-15 PROCEDURE — 97535 SELF CARE MNGMENT TRAINING: CPT

## 2019-02-15 PROCEDURE — 99223 1ST HOSP IP/OBS HIGH 75: CPT | Performed by: INTERNAL MEDICINE

## 2019-02-15 PROCEDURE — 99232 SBSQ HOSP IP/OBS MODERATE 35: CPT | Performed by: NURSE PRACTITIONER

## 2019-02-15 RX ORDER — ONDANSETRON 2 MG/ML
4 INJECTION INTRAMUSCULAR; INTRAVENOUS EVERY 8 HOURS PRN
Status: DISCONTINUED | OUTPATIENT
Start: 2019-02-15 | End: 2019-02-19 | Stop reason: HOSPADM

## 2019-02-15 RX ORDER — POTASSIUM CHLORIDE 20 MEQ/1
20 TABLET, EXTENDED RELEASE ORAL ONCE
Status: COMPLETED | OUTPATIENT
Start: 2019-02-15 | End: 2019-02-15

## 2019-02-15 RX ADMIN — OXYCODONE HYDROCHLORIDE 2.5 MG: 5 TABLET ORAL at 16:09

## 2019-02-15 RX ADMIN — HEPARIN SODIUM 5000 UNITS: 5000 INJECTION INTRAVENOUS; SUBCUTANEOUS at 05:27

## 2019-02-15 RX ADMIN — FOLIC ACID 1 MG: 5 INJECTION, SOLUTION INTRAMUSCULAR; INTRAVENOUS; SUBCUTANEOUS at 09:22

## 2019-02-15 RX ADMIN — ESCITALOPRAM OXALATE 10 MG: 10 TABLET ORAL at 09:22

## 2019-02-15 RX ADMIN — POTASSIUM CHLORIDE 20 MEQ: 1500 TABLET, EXTENDED RELEASE ORAL at 14:45

## 2019-02-15 RX ADMIN — CEFAZOLIN SODIUM 1000 MG: 10 INJECTION, POWDER, FOR SOLUTION INTRAVENOUS at 17:28

## 2019-02-15 RX ADMIN — PIPERACILLIN SODIUM,TAZOBACTAM SODIUM 4.5 G: 4; .5 INJECTION, POWDER, FOR SOLUTION INTRAVENOUS at 12:00

## 2019-02-15 RX ADMIN — HEPARIN SODIUM 5000 UNITS: 5000 INJECTION INTRAVENOUS; SUBCUTANEOUS at 22:08

## 2019-02-15 RX ADMIN — HEPARIN SODIUM 5000 UNITS: 5000 INJECTION INTRAVENOUS; SUBCUTANEOUS at 14:45

## 2019-02-15 RX ADMIN — METRONIDAZOLE 500 MG: 500 INJECTION, SOLUTION INTRAVENOUS at 18:14

## 2019-02-15 RX ADMIN — PIPERACILLIN SODIUM,TAZOBACTAM SODIUM 4.5 G: 4; .5 INJECTION, POWDER, FOR SOLUTION INTRAVENOUS at 00:02

## 2019-02-15 RX ADMIN — CYANOCOBALAMIN 1000 MCG: 1000 INJECTION, SOLUTION INTRAMUSCULAR at 10:28

## 2019-02-15 RX ADMIN — ONDANSETRON 4 MG: 2 INJECTION INTRAMUSCULAR; INTRAVENOUS at 17:28

## 2019-02-15 RX ADMIN — PIPERACILLIN SODIUM,TAZOBACTAM SODIUM 4.5 G: 4; .5 INJECTION, POWDER, FOR SOLUTION INTRAVENOUS at 05:27

## 2019-02-15 RX ADMIN — SENNOSIDES AND DOCUSATE SODIUM 1 TABLET: 8.6; 5 TABLET ORAL at 22:08

## 2019-02-15 RX ADMIN — TAMSULOSIN HYDROCHLORIDE 0.4 MG: 0.4 CAPSULE ORAL at 16:03

## 2019-02-15 NOTE — SOCIAL WORK
CM met with pt's dtr Marge Go to complete CM assessment/discuss dcp  PTA pt lived alone in a 2-story home with 9 steps at entrance  Pt's dtr reports recently he had declined with completing ADLs  Pt does not have any DME or prior C  Pt's dtr unsure of preferred pharmacy  No MH/D&A tx hx  No POA  Main contact dtr: Marge Go ([phone])  CM discussed inpt rehab recommendations with pt's dtr  Pt's dtr would like pt to be d/c with her upon d/c--plan would be for her and pt to stay in a hotel until pt's house is cleaned/organized---then plan would be for pt to return to [facility] with dtr to live  Pt's dtr requesting to speak with MD to get medical update and to discuss what safest d/c plan for pt would be  Pt's dtr open to pt going to [facility] facility upon d/c if needed  CM reviewed d/c planning process including the following: identifying help at home, patient preference for d/c planning needs, Discharge Lounge, [facility] Meds to Bed program, availability of treatment team to discuss questions or concerns patient and/or family may have regarding understanding medications and recognizing signs and symptoms once discharged  CM also encouraged patient to follow up with all recommended appointments after discharge  Patient advised of importance for patient and family to participate in managing patients medical well being

## 2019-02-15 NOTE — SOCIAL WORK
Pt's dtr agreeable with d/c to STR for pt  Pt's dtr requesting referral to [facility]  Referral made via [facility]

## 2019-02-15 NOTE — PLAN OF CARE
Problem: DISCHARGE PLANNING - CARE MANAGEMENT  Goal: Discharge to post-acute care or home with appropriate resources  Description  INTERVENTIONS:  - Conduct assessment to determine patient/family and health care team treatment goals, and need for post-acute services based on payer coverage, community resources, and patient preferences, and barriers to discharge  - Address psychosocial, clinical, and financial barriers to discharge as identified in assessment in conjunction with the patient/family and health care team  - Arrange appropriate level of post-acute services according to patient's   needs and preference and payer coverage in collaboration with the physician and health care team  - Communicate with and update the patient/family, physician, and health care team regarding progress on the discharge plan  - Arrange appropriate transportation to post-acute venues  -Anticipate d/c to STR vs home with dtr   Outcome: Progressing

## 2019-02-15 NOTE — CONSULTS
Consultation - Infectious Disease   Aylin Guillory 81 y o  male MRN: [id number]  Unit/Bed#: [facility] 928-01 Encounter: [id number]      Inpatient consult to Infectious Diseases  Consult performed by: Marcellus Manley MD  Consult ordered by: Teri Kruger MD          IMPRESSION & RECOMMENDATIONS:   Impression:  1  E coli and Clostridium perfringens sepsis secondary to cholecystitis with cholangitis  2  S/P laparoscopic cholecystectomy POD 2     Recommendations:    Discuss with the primary service  1  Will change piperacillin/tazobactam to cefazolin 1 g q 8 hours IV and metronidazole 500 mg q 8 hours IV  HISTORY OF PRESENT ILLNESS:    Reason for Consult:  Polymicrobial sepsis  HPI:  Patient is confused  History was obtained from patient and chart  Aylin Guillory is a 81y o  year old male who was admitted through our emergency room on 02/11/19 after being seen with confusion, jaundice and fever and diagnosed with sepsis secondary to cholangitis  Patient was started on piperacillin/tazobactam 4 5 g q 6 hours IV  He underwent an ERCP on 02/12  On 2/13 patient underwent a laparoscopic cholecystectomy for cholangitis and necrotic cholecystitis     Two of 2 blood cultures have since returned with 1 showing E coli sensitive to all tested and the other showing Clostridium perfringens susceptible to penicillin and metronidazole  Review of Systems   Unable to perform ROS: Mental status change         PAST MEDICAL HISTORY:  History reviewed  No pertinent past medical history  Past Surgical History:   Procedure Laterality Date    CHOLECYSTECTOMY LAPAROSCOPIC N/A 2/13/2019    Procedure: CHOLECYSTECTOMY LAPAROSCOPIC;  Surgeon: Koko Waller MD;  [facility];  Service: General    ERCP N/A 2/12/2019    Procedure: ENDOSCOPIC RETROGRADE CHOLANGIOPANCREATOGRAPHY (ERCP); Surgeon: Darrin Davies MD;  Location[facility];   Service: Gastroenterology    TONSILLECTOMY         FAMILY HISTORY:  Non-contributory    SOCIAL HISTORY:  Social History   Former [place] , he says he lives alone and that his wife left him  Social History     Substance and Sexual Activity   Alcohol Use Yes    Alcohol/week: 0 6 oz    Types: 1 Cans of beer per week    Frequency: Monthly or less    Drinks per session: 1 or 2    Binge frequency: Never     Social History     Substance and Sexual Activity   Drug Use Not on file     Social History     Tobacco Use   Smoking Status Never Smoker   Smokeless Tobacco Never Used       ALLERGIES:  No Known Allergies    MEDICATIONS:  All current active medications have been reviewed        PHYSICAL EXAM:  Temp:  [97 7 °F (36 5 °C)-98 5 °F (36 9 °C)] 98 1 °F (36 7 °C)  HR:  [59-74] 61  Resp:  [16-20] 20  BP: (106-160)/(45-80) 106/45  SpO2:  [93 %-97 %] 95 %  Temp (24hrs), Av 1 °F (36 7 °C), Min:97 7 °F (36 5 °C), Max:98 5 °F (36 9 °C)  Current: Temperature: 98 1 °F (36 7 °C)    Intake/Output Summary (Last 24 hours) at 2/15/2019 1613  Last data filed at 2/15/2019 1337  Gross per 24 hour   Intake 357 ml   Output 1250 ml   Net -893 ml       General Appearance:  Appearing alert but confused, nontoxic, and in no distress, appears stated age   Head:  Normocephalic, without obvious abnormality, atraumatic   Eyes:  PERRL, conjunctiva pink and sclera +1 scleral icterus, both eyes   Nose: Nares normal, mucosa normal, no drainage   Throat: Oropharynx moist without lesions; lips, mucosa, and tongue normal; upper denture and many missing lower teeth   Neck: Supple, symmetrical, trachea midline, no adenopathy, no tenderness/mass/nodules   Back:   Symmetric, no curvature, ROM normal, no CVA tenderness   Lungs:   Clear to auscultation bilaterally, no audible wheezes, rhonchi and rales, respirations unlabored   Chest Wall:  No tenderness or deformity   Heart:  Regular rate and rhythm, S1, S2 normal, no murmur, rub or gallop   Abdomen:   Soft, non-tender, protuberant, lap choly incisions, positive bowel sounds, no masses, no organomegaly    No CVA tenderness   Extremities: 1+ lower extremity edema with stasis changes   Skin: Lap choly abdominal incision sites are clean   Lymph nodes: Cervical, supraclavicular, and axillary nodes normal   Neurologic: Alert disoriented to time and place, extremity strength 5/5 and symmetric           Invasive Devices:   Peripheral IV 02/13/19 Distal;Left Forearm (Active)   Site Assessment Intact 2/15/2019  8:00 AM   Dressing Type Transparent 2/15/2019  8:00 AM   Line Status Saline locked; Flushed 2/15/2019  8:00 AM   Dressing Status Clean;Dry; Intact 2/15/2019  8:00 AM   Dressing Change Due 02/17/19 2/14/2019  9:00 AM       LABS, IMAGING, & OTHER STUDIES:  Lab Results:      I have personally reviewed pertinent labs  Results from last 7 days   Lab Units 02/15/19  0525 02/14/19  0603 02/13/19  1434   WBC Thousand/uL 7 73 10 55* 12 63*   HEMOGLOBIN g/dL 8 6* 9 4* 9 6*   PLATELETS Thousands/uL 232 200 178     Results from last 7 days   Lab Units 02/15/19  0525 02/14/19  0603 02/13/19  0443   SODIUM mmol/L 138 139 140   POTASSIUM mmol/L 3 7 4 0 4 5   CHLORIDE mmol/L 105 106 108   CO2 mmol/L 28 25 24   BUN mg/dL 14 15 21   CREATININE mg/dL 0 51* 0 47* 0 58*   EGFR ml/min/1 73sq m 100 104 95   CALCIUM mg/dL 7 8* 7 9* 8 1*   AST U/L 39 54* 49*   ALT U/L 52 60 74   ALK PHOS U/L 133* 150* 163*     Results from last 7 days   Lab Units 02/11/19  1612 02/11/19  1604   BLOOD CULTURE  Clostridium perfringens* Escherichia coli*   GRAM STAIN RESULT  Gram positive rods Clostridium/Bacillus-like* Gram negative rods*       Imaging Studies:   I have personally reviewed pertinent imaging study reports and images in PACS  EKG, Pathology, and Other Studies:   I have personally reviewed pertinent reports

## 2019-02-15 NOTE — PROGRESS NOTES
Progress Note - Angie Buckner 81 y o  male MRN: [id number]    Unit/Bed#: [facility] 928-01 Encounter: [id number]      Assessment/Plan:  1  Dementia  Alert and oriented x2 today  Continue with Vit B12 supplementation as previously outlined  Recommend follow up with [facility] for formal comprehensive eval and supportive resources    2  Delirium  Alert and oriented x2  Continue with impulsiveness and periods of forgetfulness  Remains on 1:1  Continue geriatric pain protocol  Frequent reorientation, distraction, redirection    3  Ambulatory dysfunction/ falls  Fall precautions  PT/OT  Recommend in patient rehab post hospitalization    4  Deconditioning  Encourage frequent oob and mobilization  PT/OT  Encourage po nutrition/ hydration  Recommend rehab  Recommend protein supplementation    5  Cholangitis  S/p lap josé luis  Surgery following      Subjective:   Patient is alert and oriented x2, name and place, remains 1:1  Per nursing, improved, OOB, had BM and eating well  Objective:     Vitals: Blood pressure 138/59, pulse 67, temperature 98 2 °F (36 8 °C), resp  rate 18, height 5' 11" (1 803 m), weight 70 6 kg (155 lb 10 3 oz), SpO2 94 %  ,Body mass index is 21 71 kg/m²  Intake/Output Summary (Last 24 hours) at 2/15/2019 1119  Last data filed at 2/15/2019 0654  Gross per 24 hour   Intake    Output 650 ml   Net -650 ml       Physical Exam: General appearance: alert and oriented, in no acute distress  Lungs: clear to auscultation bilaterally  Heart: regular rate and rhythm, S1, S2 normal, no murmur, click, rub or gallop  Abdomen: soft, non-tender; bowel sounds normal; no masses,  no organomegaly     Invasive Devices     Peripheral Intravenous Line            Peripheral IV 02/13/19 Distal;Left Forearm 1 day                Lab, Imaging and other studies: I have personally reviewed pertinent reports      VTE Pharmacologic Prophylaxis: Sequential compression device (Venodyne)   VTE Mechanical Prophylaxis: sequential compression device
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
## 2019-02-15 NOTE — NURSING NOTE
Security spoke to patient's family regarding the bag of patient's things being picked up as they may possibly contain bed bugs  Family stated they do not want the belongings  All belongings in the bag brought up from OR have been picked up and properly discarded by environmental services

## 2019-02-15 NOTE — PLAN OF CARE
Problem: Nutrition/Hydration-ADULT  Goal: Nutrient/Hydration intake appropriate for improving, restoring or maintaining nutritional needs  Description  Monitor and assess patient's nutrition/hydration status for malnutrition (ex- brittle hair, bruises, dry skin, pale skin and conjunctiva, muscle wasting, smooth red tongue, and disorientation)  Collaborate with interdisciplinary team and initiate plan and interventions as ordered  Monitor patient's weight and dietary intake as ordered or per policy  Utilize nutrition screening tool and intervene per policy  Determine patient's food preferences and provide high-protein, high-caloric foods as appropriate       INTERVENTIONS:  - Monitor oral intake, urinary output, labs, and treatment plans  - Assess nutrition and hydration status and recommend course of action  - Evaluate amount of meals eaten  - Assist patient with eating if necessary   - Allow adequate time for meals  - Recommend/ encourage appropriate diets, oral nutritional supplements, and vitamin/mineral supplements  - Order, calculate, and assess calorie counts as needed  - Recommend, monitor, and adjust tube feedings and TPN/PPN based on assessed needs  - Assess need for intravenous fluids  - Provide specific nutrition/hydration education as appropriate  - Include patient/family/caregiver in decisions related to nutrition  Outcome: Completed

## 2019-02-15 NOTE — PROGRESS NOTES
Progress Note - General Surgery   Giselle Hernandez 81 y o  male MRN: [id number]  Unit/Bed#: [facility] 928-01      Subjective:    81 y o male POD 2 s/p lap josé luis  Doing well this morning, pain controlled, denies N/V/C/D  Passing flatus       Labs:  0   Lab Value Date/Time    HCT 28 3 (L) 02/14/2019 0603    HCT 28 8 (L) 02/13/2019 1434    HCT 32 0 (L) 02/13/2019 0520    HGB 9 4 (L) 02/14/2019 0603    HGB 9 6 (L) 02/13/2019 1434    HGB 10 3 (L) 02/13/2019 0520    INR 1 15 02/12/2019 0439    WBC 10 55 (H) 02/14/2019 0603    WBC 12 63 (H) 02/13/2019 1434    WBC 13 27 (H) 02/13/2019 0520       Meds:    Current Facility-Administered Medications:     cyanocobalamin injection 1,000 mcg, 1,000 mcg, Intramuscular, Every Other Day, Leopoldo Hale, PA-C    escitalopram (LEXAPRO) tablet 10 mg, 10 mg, Oral, Daily, Cally Masterson MD, 10 mg at 93/80/42 7403    folic acid 1 mg in sodium chloride 0 9 % 50 mL IVPB, 1 mg, Intravenous, Daily, Jefe Lou MD, Last Rate: 100 mL/hr at 02/14/19 1252, 1 mg at 02/14/19 1252    heparin (porcine) subcutaneous injection 5,000 Units, 5,000 Units, Subcutaneous, Q8H [address], 5,000 Units at 02/15/19 0527 **AND** [COMPLETED] Platelet count, , , Once, Patty Sultana MD    HYDROmorphone (DILAUDID) injection 0 2 mg, 0 2 mg, Intravenous, Q4H PRN, Cally Masterson MD    OLANZapine (ZyPREXA) IM injection 2 5 mg, 2 5 mg, Intramuscular, Q8H PRN, Leopoldo Hale, PA-C    oxyCODONE (ROXICODONE) IR tablet 2 5 mg, 2 5 mg, Oral, Q4H PRN, Cally Masterson MD    oxyCODONE (ROXICODONE) IR tablet 5 mg, 5 mg, Oral, Q4H PRN, Cally Masterson MD    piperacillin-tazobactam (ZOSYN) 4 5 g in sodium chloride 0 9 % 100 mL IVPB, 4 5 g, Intravenous, Q6H, Cally Masterson MD, Last Rate: 200 mL/hr at 02/15/19 0527, 4 5 g at 02/15/19 0527    senna-docusate sodium (SENOKOT S) 8 6-50 mg per tablet 1 tablet, 1 tablet, Oral, HS, Cally Masterson MD, 1 tablet at 02/13/19 2148    tamsulosin (FLOMAX) capsule 0 4 mg, 0 4 mg, Oral, Daily With Nighat Siddiqi MD, 0 4 mg at 02/14/19 1808    Blood Culture:   Lab Results   Component Value Date    BLOODCX Clostridium perfringens (AA) 02/11/2019       Wound Culture:   No results found for: WOUNDCULT    Ins and Outs:  I/O last 24 hours: In: 1010 [P O :360; I V :650]  Out: 3918 [Urine:1650]          Physical:  Vitals:    02/15/19 0300   BP: 126/63   Pulse: 74   Resp: 20   Temp: 98 °F (36 7 °C)   SpO2: 94%     GEN:  Well appearing, AAO x2 (self and place)  HEENT:  PERRL, mucous membranes moist  CV: limbs well perfused   LUNGS: non labored respirations, no wheeze  ABD: soft, ND/NT  EXT: no gross motor or sensory deficit    _*_*_*_*_*_*_*_*_*_*_*_*_*_*_*_*_*_*_*_*_*_*_*_*_*_*_*_*_*_*_*_*_*_*_*_*_*_*_*_*_*    Assessment:    83 y o male POD 3 s/p ERCP, POD 2 s/p Lap josé luis for cholangitis and necrotic cholecystitis  Doing well        Plan:  · Regular diet  · 4 days abx post ERCP  · Will attempt to DC 1:1 today  · Dispo: Discharge to rehab when off 1:1 x 24 hours    Taylor Gaitan MD

## 2019-02-15 NOTE — PLAN OF CARE
Problem: OCCUPATIONAL THERAPY ADULT  Goal: Performs self-care activities at highest level of function for planned discharge setting  See evaluation for individualized goals  Description  Treatment Interventions: ADL retraining, Functional transfer training, UE strengthening/ROM, Endurance training, Cognitive reorientation, Patient/family training, Equipment evaluation/education, Continued evaluation, Activityengagement  Equipment Recommended: Bedside commode       See flowsheet documentation for full assessment, interventions and recommendations  Outcome: Progressing  Note:   Limitation: Decreased ADL status, Decreased UE strength, Decreased Safe judgement during ADL, Decreased cognition, Decreased endurance, Decreased self-care trans, Decreased high-level ADLs  Prognosis: Good  Assessment: Patient participated in Skilled OT session this date with interventions consisting of functional transfers/mobility, ADL re-training, cognitive re-orientation  Patient agreeable to OT treatment session, upon arrival patient was found seated OOB to Recliner  Patient requiring one step directives for LB dressing, frequent re-direction to task, and VCs and TCs for safe use of RW  Pt also w/ decreased STM requiring cues to recall activity directions  Pt currently oriented to self and place; continues to be disoriented to date (pt also reporting it's "spring" when given choices for season)  Pt left OOB in recliner w/ 1:1 present and all needs w/in reach  Patient continues to be functioning below baseline level, occupational performance remains limited secondary to factors listed above and increased risk for falls and injury  From OT standpoint, recommendation at time of d/c would be Short Term Rehab  Patient to benefit from continued Occupational Therapy treatment while in the hospital to address deficits as defined above and maximize level of functional independence with ADLs and functional mobility        OT Discharge Recommendation: Short Term Rehab  OT - OK to Discharge: Yes(when medically appropriate)

## 2019-02-16 LAB
ALBUMIN SERPL BCP-MCNC: 2 G/DL (ref 3.5–5)
ALP SERPL-CCNC: 135 U/L (ref 46–116)
ALT SERPL W P-5'-P-CCNC: 49 U/L (ref 12–78)
ANION GAP SERPL CALCULATED.3IONS-SCNC: 6 MMOL/L (ref 4–13)
ANISOCYTOSIS BLD QL SMEAR: PRESENT
AST SERPL W P-5'-P-CCNC: 37 U/L (ref 5–45)
BASOPHILS # BLD MANUAL: 0 THOUSAND/UL (ref 0–0.1)
BASOPHILS NFR MAR MANUAL: 0 % (ref 0–1)
BILIRUB SERPL-MCNC: 1.09 MG/DL (ref 0.2–1)
BUN SERPL-MCNC: 9 MG/DL (ref 5–25)
CALCIUM SERPL-MCNC: 8 MG/DL (ref 8.3–10.1)
CHLORIDE SERPL-SCNC: 106 MMOL/L (ref 100–108)
CO2 SERPL-SCNC: 28 MMOL/L (ref 21–32)
CREAT SERPL-MCNC: 0.48 MG/DL (ref 0.6–1.3)
EOSINOPHIL # BLD MANUAL: 0.22 THOUSAND/UL (ref 0–0.4)
EOSINOPHIL NFR BLD MANUAL: 3 % (ref 0–6)
ERYTHROCYTE [DISTWIDTH] IN BLOOD BY AUTOMATED COUNT: 16.1 % (ref 11.6–15.1)
GFR SERPL CREATININE-BSD FRML MDRD: 103 ML/MIN/1.73SQ M
GLUCOSE SERPL-MCNC: 106 MG/DL (ref 65–140)
HCT VFR BLD AUTO: 27 % (ref 36.5–49.3)
HGB BLD-MCNC: 8.8 G/DL (ref 12–17)
HYPERCHROMIA BLD QL SMEAR: PRESENT
LG PLATELETS BLD QL SMEAR: PRESENT
LYMPHOCYTES # BLD AUTO: 0.67 THOUSAND/UL (ref 0.6–4.47)
LYMPHOCYTES # BLD AUTO: 9 % (ref 14–44)
MACROCYTES BLD QL AUTO: PRESENT
MCH RBC QN AUTO: 37.4 PG (ref 26.8–34.3)
MCHC RBC AUTO-ENTMCNC: 32.6 G/DL (ref 31.4–37.4)
MCV RBC AUTO: 115 FL (ref 82–98)
MONOCYTES # BLD AUTO: 0.22 THOUSAND/UL (ref 0–1.22)
MONOCYTES NFR BLD: 3 % (ref 4–12)
NEUTROPHILS # BLD MANUAL: 6.31 THOUSAND/UL (ref 1.85–7.62)
NEUTS SEG NFR BLD AUTO: 85 % (ref 43–75)
NRBC BLD AUTO-RTO: 0 /100 WBCS
OVALOCYTES BLD QL SMEAR: PRESENT
PLATELET # BLD AUTO: 256 THOUSANDS/UL (ref 149–390)
PLATELET BLD QL SMEAR: ADEQUATE
PMV BLD AUTO: 10.6 FL (ref 8.9–12.7)
POLYCHROMASIA BLD QL SMEAR: PRESENT
POTASSIUM SERPL-SCNC: 3.9 MMOL/L (ref 3.5–5.3)
PROT SERPL-MCNC: 5.6 G/DL (ref 6.4–8.2)
RBC # BLD AUTO: 2.35 MILLION/UL (ref 3.88–5.62)
RBC MORPH BLD: PRESENT
SODIUM SERPL-SCNC: 140 MMOL/L (ref 136–145)
WBC # BLD AUTO: 7.42 THOUSAND/UL (ref 4.31–10.16)

## 2019-02-16 PROCEDURE — 80053 COMPREHEN METABOLIC PANEL: CPT | Performed by: STUDENT IN AN ORGANIZED HEALTH CARE EDUCATION/TRAINING PROGRAM

## 2019-02-16 PROCEDURE — 87040 BLOOD CULTURE FOR BACTERIA: CPT | Performed by: ORTHOPAEDIC SURGERY

## 2019-02-16 PROCEDURE — 85007 BL SMEAR W/DIFF WBC COUNT: CPT | Performed by: STUDENT IN AN ORGANIZED HEALTH CARE EDUCATION/TRAINING PROGRAM

## 2019-02-16 PROCEDURE — 85027 COMPLETE CBC AUTOMATED: CPT | Performed by: STUDENT IN AN ORGANIZED HEALTH CARE EDUCATION/TRAINING PROGRAM

## 2019-02-16 PROCEDURE — 99231 SBSQ HOSP IP/OBS SF/LOW 25: CPT | Performed by: INTERNAL MEDICINE

## 2019-02-16 RX ADMIN — SENNOSIDES AND DOCUSATE SODIUM 1 TABLET: 8.6; 5 TABLET ORAL at 22:55

## 2019-02-16 RX ADMIN — CEFAZOLIN SODIUM 1000 MG: 10 INJECTION, POWDER, FOR SOLUTION INTRAVENOUS at 10:16

## 2019-02-16 RX ADMIN — ESCITALOPRAM OXALATE 10 MG: 10 TABLET ORAL at 10:14

## 2019-02-16 RX ADMIN — CEFAZOLIN SODIUM 1000 MG: 10 INJECTION, POWDER, FOR SOLUTION INTRAVENOUS at 00:46

## 2019-02-16 RX ADMIN — CEFAZOLIN SODIUM 1000 MG: 10 INJECTION, POWDER, FOR SOLUTION INTRAVENOUS at 17:19

## 2019-02-16 RX ADMIN — HEPARIN SODIUM 5000 UNITS: 5000 INJECTION INTRAVENOUS; SUBCUTANEOUS at 06:38

## 2019-02-16 RX ADMIN — TAMSULOSIN HYDROCHLORIDE 0.4 MG: 0.4 CAPSULE ORAL at 17:19

## 2019-02-16 RX ADMIN — FOLIC ACID 1 MG: 5 INJECTION, SOLUTION INTRAMUSCULAR; INTRAVENOUS; SUBCUTANEOUS at 10:16

## 2019-02-16 RX ADMIN — METRONIDAZOLE 500 MG: 500 INJECTION, SOLUTION INTRAVENOUS at 01:54

## 2019-02-16 RX ADMIN — HEPARIN SODIUM 5000 UNITS: 5000 INJECTION INTRAVENOUS; SUBCUTANEOUS at 22:55

## 2019-02-16 RX ADMIN — HEPARIN SODIUM 5000 UNITS: 5000 INJECTION INTRAVENOUS; SUBCUTANEOUS at 15:00

## 2019-02-16 RX ADMIN — METRONIDAZOLE 500 MG: 500 INJECTION, SOLUTION INTRAVENOUS at 17:20

## 2019-02-16 RX ADMIN — METRONIDAZOLE 500 MG: 500 INJECTION, SOLUTION INTRAVENOUS at 10:16

## 2019-02-16 NOTE — PROGRESS NOTES
Progress Note - General Surgery  Catherine Line 81 y o  male MRN: [id number]  Unit/Bed#: [facility] 928-01      Subjective:    81 y o male POD 3 s/p lap josé luis, also with E coli and clostridium perfringens sepsis on admission  No acute events overnight, no complaints  Able to tolerate diet, has been passing gas  Per the PCA he was less agitated last night compared to the night before         Labs:  0   Lab Value Date/Time    HCT 27 0 (L) 02/16/2019 0535    HCT 25 5 (L) 02/15/2019 0525    HCT 28 3 (L) 02/14/2019 0603    HGB 8 8 (L) 02/16/2019 0535    HGB 8 6 (L) 02/15/2019 0525    HGB 9 4 (L) 02/14/2019 0603    INR 1 15 02/12/2019 0439    WBC 7 42 02/16/2019 0535    WBC 7 73 02/15/2019 0525    WBC 10 55 (H) 02/14/2019 0603       Meds:    Current Facility-Administered Medications:     ceFAZolin (ANCEF) 1 g in sodium chloride 0 9% 50 ml IVPB, 1,000 mg, Intravenous, Q8H, Natasha Cast MD, Last Rate: 100 mL/hr at 02/16/19 0046, 1,000 mg at 02/16/19 0046    cyanocobalamin injection 1,000 mcg, 1,000 mcg, Intramuscular, Every Other Day, Len Allred PA-C, 1,000 mcg at 02/15/19 1028    escitalopram (LEXAPRO) tablet 10 mg, 10 mg, Oral, Daily, Maylin Maciel MD, 10 mg at 77/10/58 2043    folic acid 1 mg in sodium chloride 0 9 % 50 mL IVPB, 1 mg, Intravenous, Daily, Kevyn Ulrich MD, Last Rate: 100 mL/hr at 02/15/19 0922, 1 mg at 02/15/19 0328    heparin (porcine) subcutaneous injection 5,000 Units, 5,000 Units, Subcutaneous, Q8H [facility], 5,000 Units at 02/16/19 8411 **AND** [COMPLETED] Platelet count, , , Once, Ilya Aquino MD    HYDROmorphone (DILAUDID) injection 0 2 mg, 0 2 mg, Intravenous, Q4H PRN, Maylin Maciel MD    metroNIDAZOLE (FLAGYL) IVPB (premix) 500 mg, 500 mg, Intravenous, Q8H, Natasha Cast MD, Last Rate: 200 mL/hr at 02/16/19 0154, 500 mg at 02/16/19 0154    OLANZapine (ZyPREXA) IM injection 2 5 mg, 2 5 mg, Intramuscular, Q8H PRN, Len Allred PA-C    ondansetron (ZOFRAN) injection 4 mg, 4 mg, Intravenous, Q8H PRN, Pedro Luis Cheek PA-C, 4 mg at 02/15/19 1728    oxyCODONE (ROXICODONE) IR tablet 2 5 mg, 2 5 mg, Oral, Q4H PRN, David Johnson MD, 2 5 mg at 02/15/19 1609    oxyCODONE (ROXICODONE) IR tablet 5 mg, 5 mg, Oral, Q4H PRN, David Johnson MD    senna-docusate sodium (SENOKOT S) 8 6-50 mg per tablet 1 tablet, 1 tablet, Oral, HS, David Johnson MD, 1 tablet at 02/15/19 2208    tamsulosin (FLOMAX) capsule 0 4 mg, 0 4 mg, Oral, Daily With Julieta Harmon MD, 0 4 mg at 02/15/19 1603    Blood Culture:   Lab Results   Component Value Date    BLOODCX Clostridium perfringens (AA) 02/11/2019       Wound Culture:   No results found for: WOUNDCULT    Ins and Outs:  I/O last 24 hours: In: 807 [P O :617; I V :40; IV Piggyback:150]  Out: 5217 [Urine:2251]          Physical:  Vitals:    02/16/19 0322   BP: 117/66   Pulse: 64   Resp: 18   Temp: 98 2 °F (36 8 °C)   SpO2: 97%     GEN:  Well appearing, AAO x2 (person and place)  HEENT:  PERRL, mucous membranes moist  CV: limbs well perfused   LUNGS: non labored respirations, no wheeze  ABD: soft, ND/NT  Incisions cdi  EXT: no gross motor or sensory deficit    _*_*_*_*_*_*_*_*_*_*_*_*_*_*_*_*_*_*_*_*_*_*_*_*_*_*_*_*_*_*_*_*_*_*_*_*_*_*_*_*_*    Assessment:    83 y o male POD 3 s/p lap josé luis, also with E coli and clostridium perfringens sepsis on admission  Doing well  Appreciate ID recs, will fu necessary length of IV before conversion to PO  Plan:  · Regular diet  · Switch to ancef/flagyl per ID    · Will attempt to dc 1:1 today  · Pain control  · Dispo: continue inpt admission    Render Sessions, MD

## 2019-02-16 NOTE — NURSING NOTE
Pt frequently desating to 70's on RA while sleeping  Spo2 recovers to 90's quickly and without incident, pt placed on 2 L NC overnight

## 2019-02-16 NOTE — PROGRESS NOTES
Progress Note - Infectious Disease   Norma Martinezu 81 y o  male MRN: [id number]  Unit/Bed#: [facility] 928-01 Encounter: [id number]      Impression:  1  E coli and Clostridium perfringens sepsis secondary to cholecystitis with cholangitis  2  S/P laparoscopic cholecystectomy POD 3    Recommendations:  1  Discussed with surgical service  2  Continue cefazolin 1 g q 8 hours IV and metronidazole 500 mg q 8 hours IV for approximately 48 hours and will then switch to p o  Rx   3  Repeat blood culture will be done   4  Discussed with patient and family who is at bedside     Antibiotics:  1  Cefazolin 1 g q 8 hours IV, day 3 postop Rx  2  Metronidazole 500 mg q 8 hours IV day 3 postop Rx     Subjective: The patient has no complaints  His appetite is very good  Denies fevers, chills, or sweats  Denies nausea, vomiting, or diarrhea  Objective:  Vitals:  Temp:  [98 1 °F (36 7 °C)-99 °F (37 2 °C)] 98 1 °F (36 7 °C)  HR:  [54-74] 64  Resp:  [18-20] 20  BP: (117-129)/(56-67) 123/56  SpO2:  [90 %-97 %] 96 %  Temp (24hrs), Av 5 °F (36 9 °C), Min:98 1 °F (36 7 °C), Max:99 °F (37 2 °C)  Current: Temperature: 98 1 °F (36 7 °C)    Physical Exam:     General Appearance:  Alert, nontoxic, no acute distress  Throat: Oropharynx moist without lesions  Lips, mucosa, and tongue normal   Neck: Supple, symmetrical, trachea midline, no adenopathy,  no tenderness/mass/nodules   Lungs:   Clear to auscultation bilaterally, no audible wheezes, rhonchi or rales; respirations unlabored   Heart:  Regular rate and rhythm, S1, S2 normal, no murmur, rub or gallop   Abdomen:   Soft, non-tender, protuberant, clean lap choly incisions, non-distended, positive bowel sounds    No masses, no organomegaly    No CVA tenderness   Extremities: Lower extremities with 1+ edema and stasis changes   Skin: Lap choly abdominal incision sites are clean         Invasive Devices     Peripheral Intravenous Line            Peripheral IV 19 Distal;Left Forearm 2 days                Labs, Imaging, & Other studies:   All pertinent labs were personally reviewed  Results from last 7 days   Lab Units 02/16/19  0535 02/15/19  0525 02/14/19  0603   WBC Thousand/uL 7 42 7 73 10 55*   HEMOGLOBIN g/dL 8 8* 8 6* 9 4*   PLATELETS Thousands/uL 256 232 200     Results from last 7 days   Lab Units 02/16/19  0535 02/15/19  0525 02/14/19  0603   SODIUM mmol/L 140 138 139   POTASSIUM mmol/L 3 9 3 7 4 0   CHLORIDE mmol/L 106 105 106   CO2 mmol/L 28 28 25   BUN mg/dL 9 14 15   CREATININE mg/dL 0 48* 0 51* 0 47*   EGFR ml/min/1 73sq m 103 100 104   CALCIUM mg/dL 8 0* 7 8* 7 9*   AST U/L 37 39 54*   ALT U/L 49 52 60   ALK PHOS U/L 135* 133* 150*     Results from last 7 days   Lab Units 02/11/19  1612 02/11/19  1604   BLOOD CULTURE  Clostridium perfringens* Escherichia coli*   GRAM STAIN RESULT  Gram positive rods Clostridium/Bacillus-like* Gram negative rods*

## 2019-02-17 PROCEDURE — 99231 SBSQ HOSP IP/OBS SF/LOW 25: CPT | Performed by: INTERNAL MEDICINE

## 2019-02-17 PROCEDURE — 99024 POSTOP FOLLOW-UP VISIT: CPT | Performed by: SURGERY

## 2019-02-17 RX ADMIN — FOLIC ACID 1 MG: 5 INJECTION, SOLUTION INTRAMUSCULAR; INTRAVENOUS; SUBCUTANEOUS at 08:27

## 2019-02-17 RX ADMIN — METRONIDAZOLE 500 MG: 500 INJECTION, SOLUTION INTRAVENOUS at 17:32

## 2019-02-17 RX ADMIN — HEPARIN SODIUM 5000 UNITS: 5000 INJECTION INTRAVENOUS; SUBCUTANEOUS at 22:06

## 2019-02-17 RX ADMIN — CEFAZOLIN SODIUM 1000 MG: 10 INJECTION, POWDER, FOR SOLUTION INTRAVENOUS at 18:13

## 2019-02-17 RX ADMIN — HEPARIN SODIUM 5000 UNITS: 5000 INJECTION INTRAVENOUS; SUBCUTANEOUS at 14:34

## 2019-02-17 RX ADMIN — METRONIDAZOLE 500 MG: 500 INJECTION, SOLUTION INTRAVENOUS at 01:55

## 2019-02-17 RX ADMIN — CEFAZOLIN SODIUM 1000 MG: 10 INJECTION, POWDER, FOR SOLUTION INTRAVENOUS at 08:27

## 2019-02-17 RX ADMIN — CYANOCOBALAMIN 1000 MCG: 1000 INJECTION, SOLUTION INTRAMUSCULAR at 08:44

## 2019-02-17 RX ADMIN — METRONIDAZOLE 500 MG: 500 INJECTION, SOLUTION INTRAVENOUS at 08:44

## 2019-02-17 RX ADMIN — HEPARIN SODIUM 5000 UNITS: 5000 INJECTION INTRAVENOUS; SUBCUTANEOUS at 08:26

## 2019-02-17 RX ADMIN — ESCITALOPRAM OXALATE 10 MG: 10 TABLET ORAL at 08:26

## 2019-02-17 RX ADMIN — TAMSULOSIN HYDROCHLORIDE 0.4 MG: 0.4 CAPSULE ORAL at 17:34

## 2019-02-17 RX ADMIN — CEFAZOLIN SODIUM 1000 MG: 10 INJECTION, POWDER, FOR SOLUTION INTRAVENOUS at 00:59

## 2019-02-17 RX ADMIN — SENNOSIDES AND DOCUSATE SODIUM 1 TABLET: 8.6; 5 TABLET ORAL at 22:06

## 2019-02-17 NOTE — PROGRESS NOTES
Progress Note - Infectious Disease   Melanie Cronin 81 y o  male MRN: [id number]  Unit/Bed#: [facility] 928-01 Encounter: [id number]      Impression:  1  E coli and Clostridium perfringens sepsis secondary to cholecystitis with cholangitis  2  S/P laparoscopic cholecystectomy POD 4    Recommendations:  1  Discussed with surgical service   2  Continue cefazolin 1 g q 8 hours IV and metronidazole 500 mg q 8 hours IV for approximately 48 hours and will then switch to p o  Rx   3  Check repeat blood cultures that were done   4  Discussed with patient and family who is at bedside     Antibiotics:  1  Cefazolin 1 g q 8 hours IV, day 4 postop Rx  2  Metronidazole 500 mg q 8 hours IV day 4 postop Rx     Subjective: The patient has no complaints  His appetite remains excellent  Denies fevers, chills, or sweats  Denies nausea, vomiting, or diarrhea  Objective:  Vitals:  Temp:  [98 1 °F (36 7 °C)-99 °F (37 2 °C)] 98 1 °F (36 7 °C)  HR:  [57-65] 65  Resp:  [16-18] 18  BP: (145-148)/(70-71) 147/71  SpO2:  [90 %-98 %] 98 %  Temp (24hrs), Av 4 °F (36 9 °C), Min:98 1 °F (36 7 °C), Max:99 °F (37 2 °C)  Current: Temperature: 98 1 °F (36 7 °C)    Physical Exam:     General Appearance:  Alert, nontoxic, no acute distress  Throat: Oropharynx moist without lesions  Lips, mucosa, and tongue normal   Neck: Supple, symmetrical, trachea midline, no adenopathy,  no tenderness/mass/nodules   Lungs:   Clear to auscultation bilaterally, no audible wheezes, rhonchi or rales; respirations unlabored   Heart:  Regular rate and rhythm, S1, S2 normal, no murmur, rub or gallop   Abdomen:   Soft, non-tender, protuberant, clean lap choly incisions, non-distended, positive bowel sounds    No masses, no organomegaly    No CVA tenderness   Extremities: Lower extremities with 1+ edema and stasis changes   Skin: Lap choly abdominal incision sites are clean         Invasive Devices     Peripheral Intravenous Line            Peripheral IV 02/13/19 Distal;Left Forearm 3 days    Peripheral IV 02/17/19 Right Wrist less than 1 day                Labs, Imaging, & Other studies:   All pertinent labs were personally reviewed  Results from last 7 days   Lab Units 02/16/19  0535 02/15/19  0525 02/14/19  0603   WBC Thousand/uL 7 42 7 73 10 55*   HEMOGLOBIN g/dL 8 8* 8 6* 9 4*   PLATELETS Thousands/uL 256 232 200     Results from last 7 days   Lab Units 02/16/19  0535 02/15/19  0525 02/14/19  0603   SODIUM mmol/L 140 138 139   POTASSIUM mmol/L 3 9 3 7 4 0   CHLORIDE mmol/L 106 105 106   CO2 mmol/L 28 28 25   BUN mg/dL 9 14 15   CREATININE mg/dL 0 48* 0 51* 0 47*   EGFR ml/min/1 73sq m 103 100 104   CALCIUM mg/dL 8 0* 7 8* 7 9*   AST U/L 37 39 54*   ALT U/L 49 52 60   ALK PHOS U/L 135* 133* 150*     Results from last 7 days   Lab Units 02/16/19  1049 02/16/19  1048 02/11/19  1612 02/11/19  1604   BLOOD CULTURE  No Growth at 24 hrs  No Growth at 24 hrs   Clostridium perfringens* Escherichia coli*   GRAM STAIN RESULT   --   --  Gram positive rods Clostridium/Bacillus-like* Gram negative rods*

## 2019-02-17 NOTE — PROGRESS NOTES
Progress Note - General Surgery   Lesly Forte 81 y o  male MRN: [id number]  Unit/Bed#: [facility] 928-01      Subjective:    81 y o male POD 4 s/p lap josé luis, also with E coli and clostridium perfringens sepsis present on admission  No acute events overnight  Pt able to tolerate diet, reports normal bowel function  Overall doing well, no complaints      Labs:  0   Lab Value Date/Time    HCT 27 0 (L) 02/16/2019 0535    HCT 25 5 (L) 02/15/2019 0525    HCT 28 3 (L) 02/14/2019 0603    HGB 8 8 (L) 02/16/2019 0535    HGB 8 6 (L) 02/15/2019 0525    HGB 9 4 (L) 02/14/2019 0603    INR 1 15 02/12/2019 0439    WBC 7 42 02/16/2019 0535    WBC 7 73 02/15/2019 0525    WBC 10 55 (H) 02/14/2019 0603       Meds:    Current Facility-Administered Medications:     ceFAZolin (ANCEF) 1 g in sodium chloride 0 9% 50 ml IVPB, 1,000 mg, Intravenous, Q8H, Gifty Cooper MD, Stopped at 02/17/19 0129    cyanocobalamin injection 1,000 mcg, 1,000 mcg, Intramuscular, Every Other Day, Aaliyah Padilla PA-C, 1,000 mcg at 02/15/19 1028    escitalopram (LEXAPRO) tablet 10 mg, 10 mg, Oral, Daily, Moriah Shirley MD, 10 mg at 52/61/43 4773    folic acid 1 mg in sodium chloride 0 9 % 50 mL IVPB, 1 mg, Intravenous, Daily, Maria T Childers MD, Stopped at 02/16/19 1050    heparin (porcine) subcutaneous injection 5,000 Units, 5,000 Units, Subcutaneous, Q8H [address], 5,000 Units at 02/16/19 2255 **AND** [COMPLETED] Platelet count, , , Once, Garrick Mann MD    HYDROmorphone (DILAUDID) injection 0 2 mg, 0 2 mg, Intravenous, Q4H PRN, Moriah Shirley MD    metroNIDAZOLE (FLAGYL) IVPB (premix) 500 mg, 500 mg, Intravenous, Q8H, Gifty Cooper MD, Stopped at 02/17/19 0225    OLANZapine (ZyPREXA) IM injection 2 5 mg, 2 5 mg, Intramuscular, Q8H PRN, Aaliyah Padilla PA-C    ondansetron [facility]) injection 4 mg, 4 mg, Intravenous, Q8H PRN, Kole Holman PA-C, 4 mg at 02/15/19 1728    oxyCODONE (ROXICODONE) IR tablet 2 5 mg, 2 5 mg, Oral, Q4H PRN, Moriah Shirley MD, 2 5 mg at 02/15/19 1609    oxyCODONE (ROXICODONE) IR tablet 5 mg, 5 mg, Oral, Q4H PRN, Shelby Haro MD    senna-docusate sodium (SENOKOT S) 8 6-50 mg per tablet 1 tablet, 1 tablet, Oral, HS, Shelby Haro MD, 1 tablet at 02/16/19 4245    tamsulosin (FLOMAX) capsule 0 4 mg, 0 4 mg, Oral, Daily With Dinh Kang MD, 0 4 mg at 02/16/19 1719    Blood Culture:   Lab Results   Component Value Date    BLOODCX Clostridium perfringens (AA) 02/11/2019       Wound Culture:   No results found for: WOUNDCULT    Ins and Outs:  I/O last 24 hours: In: 9116 [P O :960; I V :110; IV Piggyback:670]  Out: 6923 [Urine:3401]          Physical:  Vitals:    02/17/19 0030   BP: 148/70   Pulse: 64   Resp: 16   Temp: 99 °F (37 2 °C)   SpO2: 90%     GEN:  Well appearing, AAO x2 (person and place)  HEENT: PERRL, mucous membranes moist  CV: regular rate, limbs well perfused   LUNGS: non labored respirations, no wheeze  ABD: soft, ND/NT  EXT: no gross motor or sensory deficit      _*_*_*_*_*_*_*_*_*_*_*_*_*_*_*_*_*_*_*_*_*_*_*_*_*_*_*_*_*_*_*_*_*_*_*_*_*_*_*_*_*    Assessment:    83 y o male POD 4 s/p lap josé luis, also with E coli and clostridium perfringens sepsis secondary to cholagngitis present on admission  Doing well, appreciate ID recs        Plan:  · Regular diet  · Abx per ID recs  · Pain control  · DVT ppx  · Dispo: continue inpt admission    Sydnee Chen MD

## 2019-02-18 ENCOUNTER — TELEPHONE (OUTPATIENT)
Dept: GASTROENTEROLOGY | Facility: CLINIC | Age: 84
End: 2019-02-18

## 2019-02-18 PROBLEM — K31.7 GASTRIC POLYP: Status: ACTIVE | Noted: 2019-02-18

## 2019-02-18 LAB
ALBUMIN SERPL BCP-MCNC: 2.1 G/DL (ref 3.5–5)
ALP SERPL-CCNC: 115 U/L (ref 46–116)
ALT SERPL W P-5'-P-CCNC: 35 U/L (ref 12–78)
ANION GAP SERPL CALCULATED.3IONS-SCNC: 7 MMOL/L (ref 4–13)
ANISOCYTOSIS BLD QL SMEAR: PRESENT
AST SERPL W P-5'-P-CCNC: 28 U/L (ref 5–45)
BASOPHILS # BLD MANUAL: 0.1 THOUSAND/UL (ref 0–0.1)
BASOPHILS NFR MAR MANUAL: 1 % (ref 0–1)
BILIRUB SERPL-MCNC: 1.01 MG/DL (ref 0.2–1)
BUN SERPL-MCNC: 8 MG/DL (ref 5–25)
CALCIUM SERPL-MCNC: 7.8 MG/DL (ref 8.3–10.1)
CHLORIDE SERPL-SCNC: 106 MMOL/L (ref 100–108)
CO2 SERPL-SCNC: 26 MMOL/L (ref 21–32)
CREAT SERPL-MCNC: 0.46 MG/DL (ref 0.6–1.3)
EOSINOPHIL # BLD MANUAL: 0.19 THOUSAND/UL (ref 0–0.4)
EOSINOPHIL NFR BLD MANUAL: 2 % (ref 0–6)
ERYTHROCYTE [DISTWIDTH] IN BLOOD BY AUTOMATED COUNT: 16.2 % (ref 11.6–15.1)
GFR SERPL CREATININE-BSD FRML MDRD: 104 ML/MIN/1.73SQ M
GLUCOSE SERPL-MCNC: 107 MG/DL (ref 65–140)
HCT VFR BLD AUTO: 28.2 % (ref 36.5–49.3)
HGB BLD-MCNC: 9.3 G/DL (ref 12–17)
LYMPHOCYTES # BLD AUTO: 0.39 THOUSAND/UL (ref 0.6–4.47)
LYMPHOCYTES # BLD AUTO: 4 % (ref 14–44)
MACROCYTES BLD QL AUTO: PRESENT
MCH RBC QN AUTO: 37.2 PG (ref 26.8–34.3)
MCHC RBC AUTO-ENTMCNC: 33 G/DL (ref 31.4–37.4)
MCV RBC AUTO: 113 FL (ref 82–98)
MONOCYTES # BLD AUTO: 0.19 THOUSAND/UL (ref 0–1.22)
MONOCYTES NFR BLD: 2 % (ref 4–12)
NEUTROPHILS # BLD MANUAL: 8.74 THOUSAND/UL (ref 1.85–7.62)
NEUTS SEG NFR BLD AUTO: 90 % (ref 43–75)
NRBC BLD AUTO-RTO: 0 /100 WBCS
PLATELET # BLD AUTO: 331 THOUSANDS/UL (ref 149–390)
PLATELET BLD QL SMEAR: ADEQUATE
PMV BLD AUTO: 10.5 FL (ref 8.9–12.7)
POIKILOCYTOSIS BLD QL SMEAR: PRESENT
POLYCHROMASIA BLD QL SMEAR: PRESENT
POTASSIUM SERPL-SCNC: 3.9 MMOL/L (ref 3.5–5.3)
PROT SERPL-MCNC: 5.6 G/DL (ref 6.4–8.2)
RBC # BLD AUTO: 2.5 MILLION/UL (ref 3.88–5.62)
RBC MORPH BLD: PRESENT
SODIUM SERPL-SCNC: 139 MMOL/L (ref 136–145)
VARIANT LYMPHS # BLD AUTO: 1 %
WBC # BLD AUTO: 9.71 THOUSAND/UL (ref 4.31–10.16)

## 2019-02-18 PROCEDURE — 99231 SBSQ HOSP IP/OBS SF/LOW 25: CPT | Performed by: PHYSICIAN ASSISTANT

## 2019-02-18 PROCEDURE — 99024 POSTOP FOLLOW-UP VISIT: CPT | Performed by: SURGERY

## 2019-02-18 PROCEDURE — 97116 GAIT TRAINING THERAPY: CPT

## 2019-02-18 PROCEDURE — 85027 COMPLETE CBC AUTOMATED: CPT | Performed by: SURGERY

## 2019-02-18 PROCEDURE — 97110 THERAPEUTIC EXERCISES: CPT

## 2019-02-18 PROCEDURE — 80053 COMPREHEN METABOLIC PANEL: CPT | Performed by: SURGERY

## 2019-02-18 PROCEDURE — 85007 BL SMEAR W/DIFF WBC COUNT: CPT | Performed by: SURGERY

## 2019-02-18 PROCEDURE — 97530 THERAPEUTIC ACTIVITIES: CPT

## 2019-02-18 PROCEDURE — 99232 SBSQ HOSP IP/OBS MODERATE 35: CPT | Performed by: INTERNAL MEDICINE

## 2019-02-18 PROCEDURE — 97535 SELF CARE MNGMENT TRAINING: CPT

## 2019-02-18 RX ORDER — METRONIDAZOLE 500 MG/1
500 TABLET ORAL EVERY 8 HOURS SCHEDULED
Status: DISCONTINUED | OUTPATIENT
Start: 2019-02-18 | End: 2019-02-19 | Stop reason: HOSPADM

## 2019-02-18 RX ADMIN — HEPARIN SODIUM 5000 UNITS: 5000 INJECTION INTRAVENOUS; SUBCUTANEOUS at 13:11

## 2019-02-18 RX ADMIN — TAMSULOSIN HYDROCHLORIDE 0.4 MG: 0.4 CAPSULE ORAL at 17:45

## 2019-02-18 RX ADMIN — SENNOSIDES AND DOCUSATE SODIUM 1 TABLET: 8.6; 5 TABLET ORAL at 22:28

## 2019-02-18 RX ADMIN — METRONIDAZOLE 500 MG: 500 TABLET ORAL at 18:30

## 2019-02-18 RX ADMIN — CEFAZOLIN SODIUM 1000 MG: 10 INJECTION, POWDER, FOR SOLUTION INTRAVENOUS at 09:16

## 2019-02-18 RX ADMIN — ESCITALOPRAM OXALATE 10 MG: 10 TABLET ORAL at 09:16

## 2019-02-18 RX ADMIN — CEFAZOLIN SODIUM 1000 MG: 10 INJECTION, POWDER, FOR SOLUTION INTRAVENOUS at 17:46

## 2019-02-18 RX ADMIN — CEFAZOLIN SODIUM 1000 MG: 10 INJECTION, POWDER, FOR SOLUTION INTRAVENOUS at 00:05

## 2019-02-18 RX ADMIN — FOLIC ACID 1 MG: 5 INJECTION, SOLUTION INTRAMUSCULAR; INTRAVENOUS; SUBCUTANEOUS at 09:16

## 2019-02-18 RX ADMIN — METRONIDAZOLE 500 MG: 500 INJECTION, SOLUTION INTRAVENOUS at 01:12

## 2019-02-18 RX ADMIN — HEPARIN SODIUM 5000 UNITS: 5000 INJECTION INTRAVENOUS; SUBCUTANEOUS at 22:26

## 2019-02-18 RX ADMIN — HEPARIN SODIUM 5000 UNITS: 5000 INJECTION INTRAVENOUS; SUBCUTANEOUS at 05:52

## 2019-02-18 RX ADMIN — METRONIDAZOLE 500 MG: 500 INJECTION, SOLUTION INTRAVENOUS at 09:16

## 2019-02-18 NOTE — PROGRESS NOTES
Progress Note - General Surgery   Dre Hammond 81 y o  male MRN: [id number]  Unit/Bed#: [facility] 928-01 Encounter: [id number]    Assessment:  73-year-old male with E coli and Clostridium perfringens sepsis secondary to cholecystitis now postop day 5 status post laparoscopic cholecystectomy    Plan:  Diet as tolerated  Antibiotics per Infectious Disease service  Currently on Ancef/Flagyl  Analgesia  DVT prophylaxis  Aggressive pulmonary toilet/incentive spirometry  Ambulate with PT/OT  Continue home medications  Disposition planning - pending placement to short-term rehab    Subjective/Objective   Chief Complaint:     Subjective:  No acute events overnight  Remains afebrile and hemodynamically normal    Objective:     Blood pressure 134/54, pulse 64, temperature 98 6 °F (37 °C), resp  rate 20, height 5' 11" (1 803 m), weight 78 4 kg (172 lb 12 oz), SpO2 94 %  ,Body mass index is 24 09 kg/m²  Intake/Output Summary (Last 24 hours) at 2/18/2019 0540  Last data filed at 2/18/2019 0400  Gross per 24 hour   Intake 1385 ml   Output 2090 ml   Net -705 ml       Invasive Devices     Peripheral Intravenous Line            Peripheral IV 02/17/19 Right Wrist less than 1 day                Physical Exam:   No acute distress  Regular rate and rhythm  Nonlabored respirations on room air  Abdomen soft, nontender, nondistended    Incisions clean dry and intact    Lab, Imaging and other studies:CBC: No results found for: WBC, HGB, HCT, MCV, PLT, ADJUSTEDWBC, MCH, MCHC, RDW, MPV, NRBC, CMP: No results found for: SODIUM, K, CL, CO2, ANIONGAP, BUN, CREATININE, GLUCOSE, CALCIUM, AST, ALT, ALKPHOS, PROT, BILITOT, EGFR, Coagulation: No results found for: PT, INR, APTT, Urinalysis: No results found for: COLORU, CLARITYU, SPECGRAV, PHUR, LEUKOCYTESUR, NITRITE, PROTEINUA, GLUCOSEU, KETONESU, BILIRUBINUR, BLOODU, Amylase: No results found for: AMYLASE, Lipase: No results found for: LIPASE  VTE Pharmacologic Prophylaxis: Heparin  VTE Mechanical Prophylaxis: sequential compression device

## 2019-02-18 NOTE — RESTORATIVE TECHNICIAN NOTE
Restorative Specialist Mobility Note       Activity: Ambulate in childers, Ambulate in room, Bathroom privileges, Chair, Stand at bedside(Educated/encouraged pt to ambulate with assistance 3-4 x's/day  Chair alarm on   Pt callbell, phone/tray within reach )     Assistive Device: Front wheel walker       Héctor OBRIEN, Restorative Technician, [employer] [facility]

## 2019-02-18 NOTE — SOCIAL WORK
Anticipate pt will be medically stable for d/c tomorrow  SNF auth request case opened with [facility] ([phone])--pending Ref # [id number]

## 2019-02-18 NOTE — UTILIZATION REVIEW
Continued Stay Review    Date: 2/16/19    Assessment/Plan:     2/16 General Surgery Progress Note:    Assessment:     83 y o male POD 3 s/p lap josé luis, also with E coli and clostridium perfringens sepsis on admission  Appreciate Infectious Disease recommendations  Plan:    Regular diet, Ancef/Flagyl per ID, will attempt to d/c 1:1 today, pain control  2/16 Infectious Disease Progress note:    Continue cefazolin 1 g q 8 hours IV and metronidazole 500 mg q 8 hours IV for approximately 48 hours and will then switch to p o  Repeat blood culture will be done  Vital Signs:     BP: 117/66   Pulse: 64   Resp: 18   Temp: 98 2 °F (36 8 °C)   SpO2: 97%     Medications:      Current Facility-Administered Medications:       ceFAZolin (ANCEF) 1 g in sodium chloride 0 9% 50 ml IVPB, 1,000 mg, Intravenous, Q8H    metroNIDAZOLE (FLAGYL) IVPB (premix) 500 mg, 500 mg, Intravenous, Q8H        cyanocobalamin injection 1,000 mcg, 1,000 mcg, Intramuscular, Every Other Day,     escitalopram (LEXAPRO) tablet 10 mg, 10 mg, Oral, Daily,     folic acid 1 mg in sodium chloride 0 9 % 50 mL IVPB, 1 mg, Intravenous, Daily, Yu Chavez MD, Last Rate: 100 mL/hr at 02/15/19 0922, 1 mg at 02/15/19 5844    heparin (porcine) subcutaneous injection 5,000 Units, 5,000 Units, Subcutaneous, Q8H [address], 5,000 Units at 02/16/19 1390 **AND** [COMPLETED] Platelet count, , , Once,     HYDROmorphone (DILAUDID) injection 0 2 mg, 0 2 mg, Intravenous, Q4H PRN,     OLANZapine (ZyPREXA) IM injection 2 5 mg, 2 5 mg, Intramuscular, Q8H PRN, [place] Hall PA-C    ondansetron (ZOFRAN) injection 4 mg, 4 mg, Intravenous, Q8H PRN, Clementine Holman PA-C, 4 mg at 02/15/19 1728    oxyCODONE (ROXICODONE) IR tablet 2 5 mg, 2 5 mg, Oral, Q4H PRN, Caroline Rodriguez MD, 2 5 mg at 02/15/19 1609    oxyCODONE (ROXICODONE) IR tablet 5 mg, 5 mg, Oral, Q4H PRN, Caroline Rodriguez MD    senna-docusate sodium (SENOKOT S) 8 6-50 mg per tablet 1 tablet, 1 tablet, Oral, HS, Jian Steinberg MD, 1 tablet at 02/15/19 2208    tamsulosin [facility]) capsule 0 4 mg, 0 4 mg, Oral, Daily With Yolanda Simmons MD, 0 4 mg at 02/15/19 1603      Pertinent Labs/Diagnostic Results:      02/16/19 0535    WBC 4 31 - 10 16 Thousand/uL 7 42    RBC 3 88 - 5 62 Million/uL 2 35    Low     Hemoglobin 12 0 - 17 0 g/dL 8 8      Low     Hematocrit 36 5 - 49 3 % 27 0    Low         02/16/19 0535    Calcium 8 3 - 10 1 mg/dL 8 0  Low     AST 5 - 45 U/L 37    ALT 12 - 78 U/L 49    Alkaline Phosphatase 46 - 116 U/L 135   High     Total Protein 6 4 - 8 2 g/dL 5 6    Low     Albumin 3 5 - 5 0 g/dL 2        Low     Total Bilirubin 0 20 - 1 00 mg/dL 1 09   High         Age/Sex: 81 y o  male     Discharge Plan: TBD

## 2019-02-18 NOTE — TELEPHONE ENCOUNTER
EGD scheduled with Dr Anais Armenta 4/26/19 at [facility] as per Maria Fletcher and Pilo Real to go over 15 mins of block time left message for patient to go over instructions

## 2019-02-18 NOTE — PROGRESS NOTES
Progress Note - Infectious Disease   Christi Nip 81 y o  male MRN: [id number]  Unit/Bed#: [facility] 928-01 Encounter: [id number]      Impression:  1  E coli and Clostridium perfringens sepsis secondary to cholecystitis with cholangitis  2  S/P laparoscopic cholecystectomy POD 5    Recommendations:  1  Discussed with surgical service   2  Continue cefazolin 1 g q 8 hours IV and switch metronidazole 500 mg q 8 hours to p o   through today  and will then switch cefazolin tomorrow to p o  Bactrim DS 1 q 12 hours for additional 48 hours  3  Check repeat blood cultures that were done  and are negative at 48 hours  4  Discussed with patient and daughter who is at bedside     Antibiotics:  1  Cefazolin 1 g q 8 hours IV, day 5 postop Rx  2  Metronidazole 500 mg q 8 hours p o  day 5 postop Rx     Subjective: The patient has no complaints  His appetite remains excellent  Denies fevers, chills, or sweats  Denies nausea, vomiting, or diarrhea  Objective:  Vitals:  Temp:  [97 2 °F (36 2 °C)-98 6 °F (37 °C)] 97 2 °F (36 2 °C)  HR:  [64] 64  Resp:  [14-20] 14  BP: (124-134)/(54-58) 124/58  SpO2:  [94 %] 94 %  Temp (24hrs), Av 9 °F (36 6 °C), Min:97 2 °F (36 2 °C), Max:98 6 °F (37 °C)  Current: Temperature: (!) 97 2 °F (36 2 °C)    Physical Exam:     General Appearance:  Alert, nontoxic, no acute distress  In good spirits   Throat: Oropharynx moist without lesions  Lips, mucosa, and tongue normal   Neck: Supple, symmetrical, trachea midline, no adenopathy,  no tenderness/mass/nodules   Lungs:   Clear to auscultation bilaterally, no audible wheezes, rhonchi or rales; respirations unlabored   Heart:  Regular rate and rhythm, S1, S2 normal, no murmur, rub or gallop   Abdomen:   Soft, non-tender, protuberant, clean lap choly incisions, non-distended, positive bowel sounds    No masses, no organomegaly    No CVA tenderness   Extremities: Lower extremities with 1+ edema and stasis changes   Skin: Lap choly abdominal incision sites are clean         Invasive Devices     Peripheral Intravenous Line            Peripheral IV 02/17/19 Right Wrist 1 day                Labs, Imaging, & Other studies:   All pertinent labs were personally reviewed  Results from last 7 days   Lab Units 02/18/19  0620 02/16/19  0535 02/15/19  0525   WBC Thousand/uL 9 71 7 42 7 73   HEMOGLOBIN g/dL 9 3* 8 8* 8 6*   PLATELETS Thousands/uL 331 256 232     Results from last 7 days   Lab Units 02/18/19  0620 02/16/19  0535 02/15/19  0525   SODIUM mmol/L 139 140 138   POTASSIUM mmol/L 3 9 3 9 3 7   CHLORIDE mmol/L 106 106 105   CO2 mmol/L 26 28 28   BUN mg/dL 8 9 14   CREATININE mg/dL 0 46* 0 48* 0 51*   EGFR ml/min/1 73sq m 104 103 100   CALCIUM mg/dL 7 8* 8 0* 7 8*   AST U/L 28 37 39   ALT U/L 35 49 52   ALK PHOS U/L 115 135* 133*     Results from last 7 days   Lab Units 02/16/19  1049 02/16/19  1048   BLOOD CULTURE  No Growth at 48 hrs  No Growth at 48 hrs

## 2019-02-18 NOTE — PLAN OF CARE
Problem: OCCUPATIONAL THERAPY ADULT  Goal: Performs self-care activities at highest level of function for planned discharge setting  See evaluation for individualized goals  Description  Treatment Interventions: ADL retraining, Functional transfer training, UE strengthening/ROM, Endurance training, Cognitive reorientation, Patient/family training, Equipment evaluation/education, Continued evaluation, Activityengagement  Equipment Recommended: Bedside commode       See flowsheet documentation for full assessment, interventions and recommendations  Note:   Limitation: Decreased ADL status, Decreased UE strength, Decreased Safe judgement during ADL, Decreased cognition, Decreased endurance, Decreased self-care trans, Decreased high-level ADLs  Prognosis: Good  Assessment: Patient participated in Skilled OT session this date with interventions consisting of ADL re-training, functional transfers/mobility, cognitive re-orientation  Patient agreeable to OT treatment session, upon arrival patient was found seated OOB to Recliner  Pt participated in grooming task standing at sink w/ RW - requiring SUP  2' decreased standing balance and VCs for thoroughness  Pt unable to immediately recall 2 step grooming directive, requiring repetition of directions  Pt participated in LB dressing with MOD A and toileting with MIN A  Pt A&O x2 (person and place); remains disoriented to situation (reporting he will go to phone store later) and time (however, able to correctly identify season with choices)  Pt left in bed w/ chair alarm and all needs w/in reach  Patient continues to be functioning below baseline level, occupational performance remains limited secondary to factors listed above and increased risk for falls and injury  From OT standpoint, recommendation at time of d/c would be Short Term Rehab     Patient to benefit from continued Occupational Therapy treatment while in the hospital to address deficits as defined above and maximize level of functional independence with ADLs and functional mobility  OT Discharge Recommendation: Short Term Rehab  OT - OK to Discharge:  Yes

## 2019-02-18 NOTE — PHYSICAL THERAPY NOTE
PHYSICAL THERAPY NOTE          Patient Name: Marylu Gomez  HSKQP'H Date: 2/18/2019 02/18/19 1620   Pain Assessment   Pain Assessment No/denies pain   Restrictions/Precautions   Weight Bearing Precautions Per Order No   Other Precautions Cognitive; Chair Alarm; Bed Alarm; Fall Risk   General   Chart Reviewed Yes   Response to Previous Treatment Patient with no complaints from previous session  Family/Caregiver Present No   Cognition   Overall Cognitive Status Impaired   Arousal/Participation Alert   Attention Attends with cues to redirect   Orientation Level Oriented to person;Oriented to place   Memory Decreased recall of recent events;Decreased short term memory   Following Commands Follows one step commands with increased time or repetition   Subjective   Subjective Pt willing to participate in PT treatment session    Bed Mobility   Additional Comments NA, Pt seated OOB in chair at time of PT treatment session    Transfers   Sit to Stand 4  Minimal assistance   Additional items Assist x 1; Increased time required;Verbal cues   Stand to Sit 4  Minimal assistance   Additional items Assist x 1; Increased time required;Verbal cues   Toilet transfer 4  Minimal assistance   Additional items Increased time required;Assist x 1   Additional Comments VC and TC needed for hand placement and safety    Ambulation/Elevation   Gait pattern Excessively slow; Short stride; Foward flexed; Inconsistent jamar   Gait Assistance 4  Minimal assist   Additional items Assist x 1   Assistive Device Rolling walker   Distance 55ft x 2, 45ft x 2 seated rest break between ambulation    Balance   Static Sitting Fair   Static Standing Fair -   Ambulatory Poor +   Endurance Deficit   Endurance Deficit Yes   Endurance Deficit Description fatigue    Activity Tolerance   Activity Tolerance Patient limited by fatigue   Nurse Made Aware Pt appopriate to be seen and mobilize per nsg    Exercises   TKR Sitting;10 reps;AROM; Bilateral  (x 2 sets )   Assessment   Prognosis Good   Problem List Decreased strength;Decreased endurance; Impaired balance;Decreased mobility; Decreased cognition;Decreased safety awareness; Impaired judgement   Assessment Pt resting seated OOB in chair at time of PT treatment session  Pt reports feeling "pretty good" and is willing to participate in PT treatment session  Pt able to perform all transfers with min A x 1 which is slightly improved compared to previous session  VC and TC still required for hand placement and safety and pt demonstrated slight impulsiveness with toilet transfers  Pt reported having to have a BM during PT session and pt was assisted onto toilet with min A  Constant VC needed to utilize hand rail when going from sit to stand from toilet  Pt able to tolerate increased ambulation distances this session with min A x 1 and the use of a RW which is improved compared to pervious session, however pt continues to be limited by fatigue as well as requires cues for RW management during direction changes  Pt noted to have decreased foot clearance, decreased gait speed and decreased step length during ambulation  Pt able to tolerate and perform all therex this session seated OOB in chair without any increase in complaints  VC and TC needed for hand placement and safety  Pt assisted back into chair at conclusion of PT session with all needs within reach and chair alarm turned on  Pt denies any further questions at this time  PT will continue to follow  D/C recommendation when medically cleared is rehab due to decreased functional mobility compared top baseline and increased A needed from caregiver at current time      Barriers to Discharge Decreased caregiver support   Barriers to Discharge Comments pt lives alone    Goals   Patient Goals " to get better and stronger"   [facility] Expiration Date 02/24/19   Treatment Day 2   Plan   Treatment/Interventions Functional transfer training;LE strengthening/ROM; Therapeutic exercise; Endurance training;Patient/family training;Equipment eval/education; Bed mobility;Gait training;Spoke to nursing   Progress Progressing toward goals   PT Frequency Other (Comment)  (3-5x a week )   Recommendation   Recommendation Short-term skilled PT   Equipment Recommended Walker  (RW)   PT - OK to Discharge Yes  (to rehab when medically cleared )   Brian Farah, PT

## 2019-02-18 NOTE — TELEPHONE ENCOUNTER
----- Message from Isabel Rothman sent at 2/18/2019 12:29 PM EST -----   Repeat endoscopy in the next 1-2 months to address the gastric polyp per Dr Chase Loera op notes from procedure date 2/12/2019  Thank you!

## 2019-02-18 NOTE — PROGRESS NOTES
Progress Note - Aylin Levels 81 y o  male MRN: [id number]    [facility] Encounter: [id number]      Assessment/Plan  1  Dementia  Patient A*O x person and place  Seems much less tangential and "flighty"  Dx by PCP 1/4/19  AAA referral was placed at this time  Continue with vitamin B12 1000 IM daily every other day for 1 week (started on 2/15/19) then weekly for 4-8 weeks, then will need vitamin B12 1000 mcg IM injected every month for life  Folate 12 5, continue   Patient's daughter reports she is trying to become POA, will pass information along to   The patient likely not safe to live at home alone any longer, will need long-term placement, daughter reports she may consider taking patient to [place] and have him live with her  Patient would benefit from formal cognitive assessment as outpatient, this be done at [facility] for positive aging upon discharge  Continue supportive care     2  Delirium  Improved  A*O x 2  Continue with Tylenol 650-975 mg Q 8  Continue with oxycodone 2 5, 5 mg p r n  Q 4 for moderate, severe pain  Consider Lidoderm patch if applicable  Will defer bowel regimen to surgery team  Monitor closely for urinary retention, bladder scan and straight cath as needed   Patient at risk for developing delirium, delirium preventing tactics advised  Redirect unwanted patient behaviors as first line tx  Reorient patient frequently  Avoid deliriogenic meds including tramadol, benzodiazepines, benadryl  Good sleep hygiene important, limit night time interruptions  Encourage patient to stay awake during the day  Ensure adequate hydration/nutrition  Mobilize often   If patient becomes agitated/combative and cannot be redirected recommend zyprexa 2 5mg Q8      3   Hx of Fall   Recommendations remain the same  Fall precautions  Home meds unremarkable  PT, OT  Continue supportive care  Consider adding vitamin D3 1000 International Units daily patient's medication regimen     4  Deconditioning  Recommendations remain  Secondary to hospital stay, pending surgery  Mobilize frequently when appropriate prevent further decline  PT, OT  Patient will benefit from rehab to improve gait stability and strength, and upon discharge from rehab will need long-term placement versus living with family for 24/7 supervision     5  Hypoalbuminemia  Recommendations remain the same  Recommend Ensure added to diet b i d      6  FTT  Secondary to #1  Patient will need placement vs 24/7 supervision at home     7  Cholangitis  Surgery following and managing  OR last week       Subjective:   Patient alert oriented x2  Family at bedside, appears less impulsive  Pertinent review of systems cannot be gathered secondary to patient's underlying cognitive impairment  Explained to family in detail about vitamin B12 deficiency, and replacement therapy  All questions answered  Objective:     Vitals: Blood pressure 134/54, pulse 64, temperature 98 6 °F (37 °C), resp  rate 20, height 5' 11" (1 803 m), weight 77 3 kg (170 lb 6 4 oz), SpO2 94 %  ,Body mass index is 23 77 kg/m²  Intake/Output Summary (Last 24 hours) at 2/18/2019 1318  Last data filed at 2/18/2019 1100  Gross per 24 hour   Intake 700 ml   Output 2840 ml   Net -2140 ml       Physical Exam: General : WD in NAD  HEENT : MMM no erythema or exudates  EOMI, sclera anicteric  Heart : Normal rate  Lungs : CTA  Abdomen : Soft, NT, mildly distended, BS auscultated in all 4 quads  No organomegaly  Ext :  Pulses +2/4 B/L  Neg edema B/L  Neg calf swelling B/L  Skin : Pink, warm, dry, age appropriate turgor and mobility  Neuro : Nonfocal  Psych : A * O x self, place  Not month or year  Delirium appears slightly improved       Invasive Devices     Peripheral Intravenous Line            Peripheral IV 02/17/19 Right Wrist 1 day                Lab, Imaging and other studies: I have personally reviewed pertinent reports      VTE Pharmacologic Prophylaxis: Sequential compression device (Venodyne)   VTE Mechanical Prophylaxis: sequential compression device

## 2019-02-18 NOTE — QUICK NOTE
Nurse-Patient-Provider rounds were completed with the patient's nurse today, Maryellen De La O  We discussed the plan is to continue his diet as tolerated  Continue IV antibiotics until cleared to transition to oral antibiotics by ID  Anticipate discharge to SNF / rehab once switch to oral antibiotics  We reviewed all of the invasive devices/lines/telemetry orders   - None  Pain Assessment / Plan:  - Continue analgesics as needed  Mobility Assessment / Plan:  - Activity as tolerated with assist   - Continue PT and OT evaluation and treatment as indicated  Goals / Barriers for discharge:  - Anticipate discharge in the next 24-48 hours  - Case management following; case and discharge needs discussed  I spoke with the patient's family who were present in the room during rounds to provide an update and answer their questions  All questions and concerns were addressed  I spent greater than 24 minutes reviewing the plan with the patient and the nurse, and coordinating care for the day      Joaquín Horner PA-C  2/18/2019 10:41 AM

## 2019-02-18 NOTE — PLAN OF CARE
Problem: PHYSICAL THERAPY ADULT  Goal: Performs mobility at highest level of function for planned discharge setting  See evaluation for individualized goals  Description  Treatment/Interventions: Functional transfer training, LE strengthening/ROM, Elevations, Therapeutic exercise, Endurance training, Patient/family training, Equipment eval/education, Bed mobility, Gait training, Spoke to nursing, OT, Family  Equipment Recommended: Walker(RW)       See flowsheet documentation for full assessment, interventions and recommendations  Outcome: Progressing  Note:   Prognosis: Good  Problem List: Decreased strength, Decreased endurance, Impaired balance, Decreased mobility, Decreased cognition, Decreased safety awareness, Impaired judgement  Assessment: Pt resting seated OOB in chair at time of PT treatment session  Pt reports feeling "pretty good" and is willing to participate in PT treatment session  Pt able to perform all transfers with min A x 1 which is slightly improved compared to previous session  VC and TC still required for hand placement and safety and pt demonstrated slight impulsiveness with toilet transfers  Pt reported having to have a BM during PT session and pt was assisted onto toilet with min A  Constant VC needed to utilize hand rail when going from sit to stand from toilet  Pt able to tolerate increased ambulation distances this session with min A x 1 and the use of a RW which is improved compared to pervious session, however pt continues to be limited by fatigue as well as requires cues for RW management during direction changes  Pt noted to have decreased foot clearance, decreased gait speed and decreased step length during ambulation  Pt able to tolerate and perform all therex this session seated OOB in chair without any increase in complaints  VC and TC needed for hand placement and safety   Pt assisted back into chair at conclusion of PT session with all needs within reach and chair alarm turned on  Pt denies any further questions at this time  PT will continue to follow  D/C recommendation when medically cleared is rehab due to decreased functional mobility compared top baseline and increased A needed from caregiver at current time  Barriers to Discharge: Decreased caregiver support  Barriers to Discharge Comments: pt lives alone   Recommendation: Short-term skilled PT     PT - OK to Discharge: Yes(to rehab when medically cleared )    See flowsheet documentation for full assessment

## 2019-02-18 NOTE — OCCUPATIONAL THERAPY NOTE
[address] Progress Note     Patient Name: Any Braswell  SGTCS'P Date: 2/18/2019  Problem List  Patient Active Problem List   Diagnosis    Cholangitis    Hyperbilirubinemia    Cholecystitis    Altered mental status    RUQ pain    Transaminitis    Severe sepsis ([facility] [place] [address] )    Dementia    Delirium    Hx of fall    Physical deconditioning    Hypoalbuminemia    Gastric polyp             02/18/19 1520   Restrictions/Precautions   Weight Bearing Precautions Per Order No   Other Precautions Cognitive; Chair Alarm; Fall Risk   General   Response to Previous Treatment Patient with no complaints from previous session   Lifestyle   Autonomy Pt reports IND all ADLs and functional mobility w/o an AD  IADLs unclear - reports neighbor assists house maintenance  Reciprocal Relationships supportive family   Service to Others retired   Intrinsic Gratification Pt reports he is inactive; used to enjoy boxing, soccer, football   Pain Assessment   Pain Assessment 0-10   Pain Score No Pain   ADL   Grooming Assistance 5  Supervision/Setup   Grooming Deficit Setup;Supervision/safety; Increased time to complete  (VCs for thoroughness & repetition of directions)   Grooming Comments Standing at sink w/ RW   LB Dressing Assistance 3  Moderate Assistance   LB Dressing Deficit Don/doff R sock; Don/doff L sock   LB Dressing Comments Pt able to doff socks, assist to lara 2' difficulty reaching toes  CGA for trunk flexion  Toileting Assistance  4  Minimal Assistance   Toileting Deficit Steadying;Verbal cueing;Clothing management up;Supervison/safety   Toileting Comments Steadying assist, assist for management of hospital gown; grab bar use   Functional Standing Tolerance   Time 3 minutes   Activity Grooming standing at sink w/ RW   Comments tolerated activity well   Bed Mobility   Additional Comments Pt OOB in chair upon arrival    Transfers   Sit to Stand 4  Minimal assistance   Additional items Assist x 1; Armrests; Increased time required;Verbal cues   Stand to Sit 4  Minimal assistance   Additional items Assist x 1; Armrests; Increased time required;Verbal cues   Toilet transfer 4  Minimal assistance   Additional items Assist x 1; Increased time required;Standard toilet  (Grab bar)   Additional Comments RW   Functional Mobility   Functional Mobility 4  Minimal assistance   Additional Comments W/in room and bathroom - VCs and TCs for safe placement of RW   Additional items Rolling walker   Cognition   Overall Cognitive Status Impaired   Arousal/Participation Alert; Cooperative   Attention Attends with cues to redirect   Orientation Level Oriented to person;Oriented to place; Disoriented to time;Disoriented to situation   Memory Decreased short term memory;Decreased recall of recent events   Following Commands Follows one step commands with increased time or repetition   Activity Tolerance   Activity Tolerance Patient tolerated treatment well   Medical Staff Made Aware Spoke to RN - pt appropriate to be seen   Assessment   Assessment Patient participated in Skilled OT session this date with interventions consisting of ADL re-training, functional transfers/mobility, cognitive re-orientation  Patient agreeable to OT treatment session, upon arrival patient was found seated OOB to Recliner  Pt participated in grooming task standing at sink w/ RW - requiring SUP  2' decreased standing balance and VCs for thoroughness  Pt unable to immediately recall 2 step grooming directive, requiring repetition of directions  Pt participated in LB dressing with MOD A and toileting with MIN A  Pt A&O x2 (person and place); remains disoriented to situation (reporting he will go to phone store later) and time (however, able to correctly identify season with choices)  Pt left in bed w/ chair alarm and all needs w/in reach   Patient continues to be functioning below baseline level, occupational performance remains limited secondary to factors listed above and increased risk for falls and injury  From OT standpoint, recommendation at time of d/c would be Short Term Rehab  Patient to benefit from continued Occupational Therapy treatment while in the hospital to address deficits as defined above and maximize level of functional independence with ADLs and functional mobility  Plan   Treatment Interventions ADL retraining;Functional transfer training;Cognitive reorientation;Patient/family training; Activityengagement   Goal Expiration Date 02/28/19   Treatment Day 2   OT Frequency 3-5x/wk   Recommendation   OT Discharge Recommendation Short Term Rehab   Equipment Recommended Bedside commode   OT - OK to Discharge Yes  (to STR when medically approriate)         Venkat Postin, OT

## 2019-02-18 NOTE — PLAN OF CARE
Problem: Potential for Falls  Goal: Patient will remain free of falls  Description  INTERVENTIONS:  - Assess patient frequently for physical needs  -  Identify cognitive and physical deficits and behaviors that affect risk of falls  -  [place] fall precautions as indicated by assessment   - Educate patient/family on patient safety including physical limitations  - Instruct patient to call for assistance with activity based on assessment  - Modify environment to reduce risk of injury  - Consider OT/PT consult to assist with strengthening/mobility  Outcome: Progressing     Problem: Prexisting or High Potential for Compromised Skin Integrity  Goal: Skin integrity is maintained or improved  Description  INTERVENTIONS:  - Identify patients at risk for skin breakdown  - Assess and monitor skin integrity  - Assess and monitor nutrition and hydration status  - Monitor labs (i e  albumin)  - Assess for incontinence   - Turn and reposition patient  - Assist with mobility/ambulation  - Relieve pressure over bony prominences  - Avoid friction and shearing  - Provide appropriate hygiene as needed including keeping skin clean and dry  - Evaluate need for skin moisturizer/barrier cream  - Collaborate with interdisciplinary team (i e  Nutrition, Rehabilitation, etc )   - Patient/family teaching  Outcome: Progressing     Problem: NEUROSENSORY - ADULT  Goal: Achieves stable or improved neurological status  Description  INTERVENTIONS  - Monitor and report changes in neurological status  - Initiate measures to prevent increased intracranial pressure  - Maintain blood pressure and fluid volume within ordered parameters to optimize cerebral perfusion  - Monitor temperature, glucose, and sodium or any other associated labs   Initiate appropriate interventions as ordered  - Monitor for seizure activity   - Administer anti-seizure medications as ordered  Outcome: Progressing  Goal: Achieves maximal functionality and self care  Description  INTERVENTIONS  - Monitor swallowing and airway patency with patient fatigue and changes in neurological status  - Encourage and assist patient to increase activity and self care with guidance from rehab services  - Encourage visually impaired, hearing impaired and aphasic patients to use assistive/communication devices  Outcome: Progressing     Problem: CARDIOVASCULAR - ADULT  Goal: Maintains optimal cardiac output and hemodynamic stability  Description  INTERVENTIONS:  - Monitor I/O, vital signs and rhythm  - Monitor for S/S and trends of decreased cardiac output i e  bleeding, hypotension  - Administer and titrate ordered vasoactive medications to optimize hemodynamic stability  - Assess quality of pulses, skin color and temperature  - Assess for signs of decreased coronary artery perfusion - ex   Angina  - Instruct patient to report change in severity of symptoms  Outcome: Progressing  Goal: Absence of cardiac dysrhythmias or at baseline rhythm  Description  INTERVENTIONS:  - Continuous cardiac monitoring, monitor vital signs, obtain 12 lead EKG if indicated  - Administer antiarrhythmic and heart rate control medications as ordered  - Monitor electrolytes and administer replacement therapy as ordered  Outcome: Progressing     Problem: GASTROINTESTINAL - ADULT  Goal: Maintains adequate nutritional intake  Description  INTERVENTIONS:  - Monitor percentage of each meal consumed  - Identify factors contributing to decreased intake, treat as appropriate  - Assist with meals as needed  - Monitor I&O, WT and lab values  - Obtain nutrition services referral as needed  Outcome: Progressing     Problem: GENITOURINARY - ADULT  Goal: Absence of urinary retention  Description  INTERVENTIONS:  - Assess patient's ability to void and empty bladder  - Monitor I/O  - Bladder scan as needed  - Discuss with physician/AP medications to alleviate retention as needed  - Discuss catheterization for long term situations as appropriate   Outcome: Progressing     Problem: METABOLIC, FLUID AND ELECTROLYTES - ADULT  Goal: Fluid balance maintained  Description  INTERVENTIONS:  - Monitor labs and assess for signs and symptoms of volume excess or deficit  - Monitor I/O and WT  - Instruct patient on fluid and nutrition as appropriate  Outcome: Progressing     Problem: SKIN/TISSUE INTEGRITY - ADULT  Goal: Skin integrity remains intact  Description  INTERVENTIONS  - Identify patients at risk for skin breakdown  - Assess and monitor skin integrity  - Assess and monitor nutrition and hydration status  - Monitor labs (i e  albumin)  - Assess for incontinence   - Turn and reposition patient  - Assist with mobility/ambulation  - Relieve pressure over bony prominences  - Avoid friction and shearing  - Provide appropriate hygiene as needed including keeping skin clean and dry  - Evaluate need for skin moisturizer/barrier cream  - Collaborate with interdisciplinary team (i e  Nutrition, Rehabilitation, etc )   - Patient/family teaching  Outcome: Progressing     Problem: HEMATOLOGIC - ADULT  Goal: Maintains hematologic stability  Description  INTERVENTIONS  - Assess for signs and symptoms of bleeding or hemorrhage  - Monitor labs  - Administer supportive blood products/factors as ordered and appropriate  Outcome: Progressing     Problem: MUSCULOSKELETAL - ADULT  Goal: Maintain or return mobility to safest level of function  Description  INTERVENTIONS:  - Assess patient's ability to carry out ADLs; assess patient's baseline for ADL function and identify physical deficits which impact ability to perform ADLs (bathing, care of mouth/teeth, toileting, grooming, dressing, etc )  - Assess/evaluate cause of self-care deficits   - Assess range of motion  - Assess patient's mobility; develop plan if impaired  - Assess patient's need for assistive devices and provide as appropriate  - Encourage maximum independence but intervene and supervise when necessary  - Involve family in performance of ADLs  - Assess for home care needs following discharge   - Request OT consult to assist with ADL evaluation and planning for discharge  - Provide patient education as appropriate  Outcome: Progressing     Problem: DISCHARGE PLANNING - CARE MANAGEMENT  Goal: Discharge to post-acute care or home with appropriate resources  Description  INTERVENTIONS:  - Conduct assessment to determine patient/family and health care team treatment goals, and need for post-acute services based on payer coverage, community resources, and patient preferences, and barriers to discharge  - Address psychosocial, clinical, and financial barriers to discharge as identified in assessment in conjunction with the patient/family and health care team  - Arrange appropriate level of post-acute services according to patient's   needs and preference and payer coverage in collaboration with the physician and health care team  - Communicate with and update the patient/family, physician, and health care team regarding progress on the discharge plan  - Arrange appropriate transportation to post-acute venues  -Anticipate d/c to STR vs home with dtr   Outcome: Progressing

## 2019-02-19 VITALS
SYSTOLIC BLOOD PRESSURE: 129 MMHG | RESPIRATION RATE: 16 BRPM | HEIGHT: 71 IN | BODY MASS INDEX: 23.86 KG/M2 | WEIGHT: 170.42 LBS | DIASTOLIC BLOOD PRESSURE: 66 MMHG | HEART RATE: 55 BPM | TEMPERATURE: 97.7 F | OXYGEN SATURATION: 94 %

## 2019-02-19 PROCEDURE — 99024 POSTOP FOLLOW-UP VISIT: CPT | Performed by: SURGERY

## 2019-02-19 PROCEDURE — 0HBRXZZ EXCISION OF TOE NAIL, EXTERNAL APPROACH: ICD-10-PCS | Performed by: PODIATRIST

## 2019-02-19 PROCEDURE — 99024 POSTOP FOLLOW-UP VISIT: CPT | Performed by: PHYSICIAN ASSISTANT

## 2019-02-19 PROCEDURE — 97530 THERAPEUTIC ACTIVITIES: CPT

## 2019-02-19 PROCEDURE — 97116 GAIT TRAINING THERAPY: CPT

## 2019-02-19 PROCEDURE — 97110 THERAPEUTIC EXERCISES: CPT

## 2019-02-19 PROCEDURE — 99232 SBSQ HOSP IP/OBS MODERATE 35: CPT | Performed by: INTERNAL MEDICINE

## 2019-02-19 RX ORDER — METRONIDAZOLE 500 MG/1
500 TABLET ORAL EVERY 8 HOURS SCHEDULED
Qty: 6 TABLET | Refills: 0 | Status: SHIPPED | OUTPATIENT
Start: 2019-02-19 | End: 2019-02-21

## 2019-02-19 RX ORDER — SULFAMETHOXAZOLE AND TRIMETHOPRIM 800; 160 MG/1; MG/1
1 TABLET ORAL EVERY 12 HOURS SCHEDULED
Qty: 4 TABLET | Refills: 0 | Status: SHIPPED | OUTPATIENT
Start: 2019-02-19 | End: 2019-02-21

## 2019-02-19 RX ORDER — AMOXICILLIN 250 MG
1 CAPSULE ORAL
Qty: 30 TABLET | Refills: 0 | Status: SHIPPED | OUTPATIENT
Start: 2019-02-19

## 2019-02-19 RX ORDER — ACETAMINOPHEN 325 MG/1
650 TABLET ORAL EVERY 6 HOURS PRN
Qty: 30 TABLET | Refills: 0
Start: 2019-02-19 | End: 2019-03-21

## 2019-02-19 RX ORDER — CYANOCOBALAMIN 1000 UG/ML
1000 INJECTION INTRAMUSCULAR; SUBCUTANEOUS EVERY OTHER DAY
Qty: 2 ML | Refills: 0 | Status: SHIPPED | OUTPATIENT
Start: 2019-02-19 | End: 2019-02-22

## 2019-02-19 RX ORDER — UREA 10 %
400 LOTION (ML) TOPICAL DAILY
Qty: 15 TABLET | Refills: 0 | Status: SHIPPED | OUTPATIENT
Start: 2019-02-19 | End: 2019-03-21

## 2019-02-19 RX ADMIN — METRONIDAZOLE 500 MG: 500 TABLET ORAL at 05:19

## 2019-02-19 RX ADMIN — TAMSULOSIN HYDROCHLORIDE 0.4 MG: 0.4 CAPSULE ORAL at 16:06

## 2019-02-19 RX ADMIN — ESCITALOPRAM OXALATE 10 MG: 10 TABLET ORAL at 08:52

## 2019-02-19 RX ADMIN — FOLIC ACID 1 MG: 5 INJECTION, SOLUTION INTRAMUSCULAR; INTRAVENOUS; SUBCUTANEOUS at 08:52

## 2019-02-19 RX ADMIN — HEPARIN SODIUM 5000 UNITS: 5000 INJECTION INTRAVENOUS; SUBCUTANEOUS at 13:41

## 2019-02-19 RX ADMIN — CEFAZOLIN SODIUM 1000 MG: 10 INJECTION, POWDER, FOR SOLUTION INTRAVENOUS at 00:33

## 2019-02-19 RX ADMIN — CEFAZOLIN SODIUM 1000 MG: 10 INJECTION, POWDER, FOR SOLUTION INTRAVENOUS at 08:52

## 2019-02-19 RX ADMIN — HEPARIN SODIUM 5000 UNITS: 5000 INJECTION INTRAVENOUS; SUBCUTANEOUS at 05:18

## 2019-02-19 RX ADMIN — CYANOCOBALAMIN 1000 MCG: 1000 INJECTION, SOLUTION INTRAMUSCULAR at 08:52

## 2019-02-19 RX ADMIN — METRONIDAZOLE 500 MG: 500 TABLET ORAL at 13:41

## 2019-02-19 RX ADMIN — CEFAZOLIN SODIUM 1000 MG: 10 INJECTION, POWDER, FOR SOLUTION INTRAVENOUS at 16:06

## 2019-02-19 NOTE — CONSULTS
Consult - Priscila Padmini U  63  81 y o  male MRN: [id number]  Unit/Bed#: [facility] 928-01 Encounter: [id number]    Assessment/Plan     Assessment:  1  Onychomycosis and onychogryphosis    Plan:  Nails x10 were sharply trimmed to normal length and thickness with a large nail nipper without incident  Recommend applying cream to feet bilaterally  For consultation, podiatry will sign off at this point, call for questions or concerns  Recommend follow-up with Dr Micah Matute outpatient  History of Present Illness     HPI:  Luh Woodward is a 81 y o  male who presents with elongated toenails  States that their nails are painful, elongated  They have difficulty applying their socks and shoes  The pressure within their shoe gear is painful and they have been unable to cut their nails adequately  Consults  Review of Systems   Constitutional: Negative  HENT: Negative  Eyes: Negative  Respiratory: Negative  Cardiovascular: Negative  Gastrointestinal: Negative  Musculoskeletal: painful toenails   Skin: elongated toenails   Neurological: Negative  Psych: negative  Historical Information   History reviewed  No pertinent past medical history  Past Surgical History:   Procedure Laterality Date    CHOLECYSTECTOMY LAPAROSCOPIC N/A 2/13/2019    Procedure: CHOLECYSTECTOMY LAPAROSCOPIC;  Surgeon: Dennis Joseph MD;  Location: [facility];  Service: General    ERCP N/A 2/12/2019    Procedure: ENDOSCOPIC RETROGRADE CHOLANGIOPANCREATOGRAPHY (ERCP); Surgeon: Fiona Logan MD;  Location: [facility];   Service: Gastroenterology    TONSILLECTOMY       Social History   Social History     Substance and Sexual Activity   Alcohol Use Yes    Alcohol/week: 0 6 oz    Types: 1 Cans of beer per week    Frequency: Monthly or less    Drinks per session: 1 or 2    Binge frequency: Never     Social History     Substance and Sexual Activity   Drug Use Not on file     Social History     Tobacco Use   Smoking Status Never Smoker   Smokeless Tobacco Never Used     Family History: History reviewed  No pertinent family history  Meds/Allergies   Medications Prior to Admission   Medication    aspirin (ECOTRIN LOW STRENGTH) 81 mg EC tablet    escitalopram (LEXAPRO) 10 mg tablet    tamsulosin (FLOMAX) 0 4 mg     No Known Allergies    Objective   First Vitals:   Blood Pressure: 140/74 (02/11/19 1559)  Pulse: 91 (02/11/19 1559)  Temperature: 100 3 °F (37 9 °C) (02/11/19 1559)  Temp Source: Rectal (02/11/19 1559)  Respirations: 20 (02/11/19 1559)  Height: 5' 11" (180 3 cm) (02/12/19 0600)  Weight - Scale: 70 1 kg (154 lb 8 7 oz) (02/12/19 0600)  SpO2: 97 % (02/11/19 1559)    Current Vitals:   Blood Pressure: 129/65 (02/19/19 0700)  Pulse: 55 (02/19/19 0700)  Temperature: 99 °F (37 2 °C) (02/19/19 0700)  Temp Source: Oral (02/19/19 0700)  Respirations: 16 (02/19/19 0700)  Height: 5' 11" (180 3 cm) (02/13/19 1326)  Weight - Scale: 77 3 kg (170 lb 6 7 oz) (02/19/19 0600)  SpO2: 92 % (02/19/19 0700)        /65 (BP Location: Left arm)   Pulse 55   Temp 99 °F (37 2 °C) (Oral)   Resp 16   Ht 5' 11" (1 803 m)   Wt 77 3 kg (170 lb 6 7 oz)   SpO2 92%   BMI 23 77 kg/m²      General Appearance:    Alert, cooperative, no distress   Head:    Normocephalic, without obvious abnormality, atraumatic   Eyes:    PERRL, conjunctiva/corneas clear, EOM's intact        Nose:   Moist mucous membranes   Neck:   Supple, symmetrical, trachea midline   Back:     Symmetric   Lungs:     Respirations unlabored   Heart:    Regular rate and rhythm, S1 and S2 normal, no murmur, rub   or gallop   Abdomen:     Soft, non-tender   Extremities:   Manual muscle test and range of motion within normal limits to bilateral feet and ankles  Denies bilateral calf pain  Endorses bilateral toenail pain times 10   Pulses:  1/4 DP and PT bilaterally  Legs to toes warm to warm     Skin:  elongated, dystrophic, thickened toenails x10 with notable subungual debris and TTP   Mild xerosis noted bilaterally  No open lesions  Neurologic:   Gross sensation is intact  Lab Results:   Admission on 02/11/2019   No results displayed because visit has over 200 results  Results from last 7 days   Lab Units 02/16/19  1049 02/16/19  1048   BLOOD CULTURE  No Growth at 48 hrs  No Growth at 48 hrs  Invalid input(s): LABAEARO            Imaging: I have personally reviewed pertinent films in PACS  EKG, Pathology, and Other Studies: I have personally reviewed pertinent reports        Code Status: Level 1 - Full Code  Advance Directive and Living Will:      Power of :    POLST:

## 2019-02-19 NOTE — DISCHARGE INSTRUCTIONS
Acute Care Surgery Discharge Instructions    Please follow-up as instructed  Activity:  - PT and OT evaluation and treatment as indicated  - No lifting greater than 20 pounds or strenuous physical activity or exercise for at least 2 weeks  - Walking and normal light activities are encouraged  - Normal daily activities including climbing steps are okay  - No driving until no longer using pain medications  Diet:    - You may resume your normal diet  Wound Care:  - May shower daily  No tub baths or swimming until cleared by your surgeon   - Wash incision gently with soap and water and pat dry  - Do not apply any creams or ointments unless instructed to do so by your surgeon  Medications:    - Complete the entire course of oral antibiotics, Bactrim and Flagyl, through 02/21/2019   - You may resume all of your regular medications, including blood thinners and aspirin, after going home unless otherwise instructed  Please refer to your discharge medication list for further details  - Please take the pain medications as directed  - You may become constipated, especially if taking pain medications  You may take any over the counter stool softeners or laxatives as needed  Examples: Milk of Magnesia, Colace, Senna  Additional Instructions:  - Please repeat blood cultures x2 sets from separate peripheral sites during the week of 03/04/2018 to confirm clearance of his bacteremia following completion of his antibiotic course  - If you have any questions or concerns after discharge please call the office   - Call office or return to ER if fever greater than 101, chills, persistent nausea/vomiting, worsening/uncontrollable pain, and/or increasing redness or purulent/foul smelling drainage from incision(s)

## 2019-02-19 NOTE — PROGRESS NOTES
Progress Note - Infectious Disease   Rexford Kawasaki 81 y o  male MRN: [id number]  Unit/Bed#: [facility] 928-01 Encounter: [id number]      Impression:  1  E coli and Clostridium perfringens sepsis secondary to cholecystitis with cholangitis  2  S/P laparoscopic cholecystectomy POD 5    Recommendations:  1  Discussed with surgical service   2  Will discontinue cefazolin 1 g q 8 hours IV after his next dose and continue metronidazole 500 mg q 8 hours p o    and will switch cefazolin tto p o  Bactrim DS 1 q 12 hours for additional 48 hours  3  Check repeat blood cultures that were done  and are negative at 72 hours  Would also obtain post therapy blood cultures 1-2 weeks after last antibiotic  4  Discussed with patient and daughter who is at bedside and surgical service who will write orders    Antibiotics:  1  Cefazolin 1 g q 8 hours IV, day 6 postop Rx  2  Metronidazole 500 mg q 8 hours p o  day 6 postop Rx     Subjective: The patient has no complaints  Denies fevers, chills, or sweats  Denies nausea, vomiting, or diarrhea  Objective:  Vitals:  Temp:  [97 2 °F (36 2 °C)-99 °F (37 2 °C)] 99 °F (37 2 °C)  HR:  [55-63] 55  Resp:  [14-20] 16  BP: (124-150)/(58-71) 129/65  SpO2:  [92 %-93 %] 92 %  Temp (24hrs), Av 3 °F (36 8 °C), Min:97 2 °F (36 2 °C), Max:99 °F (37 2 °C)  Current: Temperature: 99 °F (37 2 °C)    Physical Exam:     General Appearance:  Alert, nontoxic, no acute distress  Throat: Oropharynx moist without lesions  Lips, mucosa, and tongue normal   Neck: Supple, symmetrical, trachea midline, no adenopathy,  no tenderness/mass/nodules   Lungs:   Clear to auscultation bilaterally, no audible wheezes, rhonchi or rales; respirations unlabored   Heart:  Regular rate and rhythm, S1, S2 normal, no murmur, rub or gallop   Abdomen:   Soft, non-tender, protuberant, clean lap choly incisions, non-distended, positive bowel sounds    No masses, no organomegaly    No CVA tenderness   Extremities: Lower extremities with 1+ edema and stasis changes   Skin: Lap choly abdominal incision sites are clean         Invasive Devices     Peripheral Intravenous Line            Peripheral IV 02/17/19 Right Wrist 2 days                Labs, Imaging, & Other studies:   All pertinent labs were personally reviewed  Results from last 7 days   Lab Units 02/18/19  0620 02/16/19  0535 02/15/19  0525   WBC Thousand/uL 9 71 7 42 7 73   HEMOGLOBIN g/dL 9 3* 8 8* 8 6*   PLATELETS Thousands/uL 331 256 232     Results from last 7 days   Lab Units 02/18/19  0620 02/16/19  0535 02/15/19  0525   SODIUM mmol/L 139 140 138   POTASSIUM mmol/L 3 9 3 9 3 7   CHLORIDE mmol/L 106 106 105   CO2 mmol/L 26 28 28   BUN mg/dL 8 9 14   CREATININE mg/dL 0 46* 0 48* 0 51*   EGFR ml/min/1 73sq m 104 103 100   CALCIUM mg/dL 7 8* 8 0* 7 8*   AST U/L 28 37 39   ALT U/L 35 49 52   ALK PHOS U/L 115 135* 133*     Results from last 7 days   Lab Units 02/16/19  1049 02/16/19  1048   BLOOD CULTURE  No Growth at 48 hrs  No Growth at 48 hrs

## 2019-02-19 NOTE — PHYSICAL THERAPY NOTE
PHYSICAL THERAPY NOTE          Patient Name: Rexford Kawasaki  [id number] Date: 2/19/2019 02/19/19 1515   Pain Assessment   Pain Assessment No/denies pain   Restrictions/Precautions   Weight Bearing Precautions Per Order No   Other Precautions Cognitive; Chair Alarm; Bed Alarm; Fall Risk   General   Chart Reviewed Yes   Response to Previous Treatment Patient with no complaints from previous session  Family/Caregiver Present No   Cognition   Overall Cognitive Status Impaired   Arousal/Participation Alert   Attention Attends with cues to redirect   Orientation Level Oriented to person;Oriented to place   Memory Decreased recall of recent events   Following Commands Follows one step commands without difficulty   Subjective   Subjective Pt willing to participate in PT treatment session    Bed Mobility   Additional Comments NA, Pt seated OOB in chair at time of PT treatment session    Transfers   Sit to Stand 4  Minimal assistance   Additional items Assist x 1; Increased time required;Verbal cues   Stand to Sit 4  Minimal assistance   Additional items Assist x 1; Increased time required;Verbal cues   Additional Comments VC and TC needed for hand placement and safety    Ambulation/Elevation   Gait pattern Excessively slow; Short stride; Inconsistent jamar   Gait Assistance 4  Minimal assist   Additional items Assist x 1   Assistive Device Rolling walker   Distance 65ft x 2, 35 ft x 2   (seated rest break )   Balance   Static Sitting Fair   Static Standing Fair   Ambulatory Fair -   Endurance Deficit   Endurance Deficit Yes   Endurance Deficit Description fatigue    Activity Tolerance   Activity Tolerance Patient limited by fatigue   Nurse Made Aware Pt appropriate to be seen and mobilize per nsg    Exercises   TKR Sitting;15 reps;AROM; Bilateral  (x 2 sets )   Assessment   Prognosis Good   Problem List Decreased strength;Decreased endurance; Impaired balance;Decreased mobility; Decreased cognition; Impaired judgement;Decreased safety awareness   Assessment Pt resting seated OOB in chair at time of PT treatment session  Pt reports " I'm ready to do some activity" and is willing to participate in PT treatment session  Pt able to perform all transfers with min A x 1 this session which is unchanged from previous PT session  Pt continues to require VC and TC for hand placement and safety with all transfers as well as min A x 1 to facilitate proper weight shifts  Slight carryover noted form previous PT session  Pt able to tolerate increased ambulation this session with min A x 1 and the use of a RW  Pt required multiple VC for safety with RW management during ambulation more specifically with direction changes  Pt required seated rest break between ambulation trials  Stair training was performed and pt was able to ascend and descend 3 steps with min A x 1 without loss of balance  Pt tolerated and completed all therex seated OOB in chair  Pt continues to require VC and TC for proper form and pacing with all therex  Pt instructed to perform all seated therex 2-3 times during the day when OOB in chair  Pt assisted back into chair at conclusion of PT session with all needs within reach  Pt denies any further questions at this time  PT will continue to follow  D/C recommendation when medically cleared is rehab due to decreased caregiver support and increased A needed from caregiver at current time  Barriers to Discharge Decreased caregiver support   Barriers to Discharge Comments pt lives alone    Goals   Patient Goals " to get better"   STG Expiration Date 02/24/19   Treatment Day 3   Plan   Treatment/Interventions Functional transfer training;LE strengthening/ROM; Elevations; Therapeutic exercise; Endurance training;Patient/family training;Equipment eval/education; Bed mobility;Gait training;Spoke to nursing   Progress Progressing toward goals   PT Frequency Other (Comment)  (3-5x a week )   Recommendation   Recommendation Short-term skilled PT   Equipment Recommended Walker  (RW)   PT - OK to Discharge Yes  (when medically cleared )   Margarita Wilson, PT

## 2019-02-19 NOTE — SOCIAL WORK
CM received return phone call from [facility] with auth # [id number] [id number] 2 review due 2/22 to Briseida Shaikh Ph: [phone]/ Fax: [phone]  Pt's dtr requesting w/c [employer] transport for pt and is agreeable with fee  Transport arranged via [employer] w/c van at 6:30 to [facility]  Pt, pt's dtr, bedside RN, and [facility] notified of transport time  Chart copy requested  Transfer to facility form completed  F/u IMM reviewed with pt and dtr

## 2019-02-19 NOTE — PLAN OF CARE
Problem: PHYSICAL THERAPY ADULT  Goal: Performs mobility at highest level of function for planned discharge setting  See evaluation for individualized goals  Description  Treatment/Interventions: Functional transfer training, LE strengthening/ROM, Elevations, Therapeutic exercise, Endurance training, Patient/family training, Equipment eval/education, Bed mobility, Gait training, Spoke to nursing, OT, Family  Equipment Recommended: Walker(RW)       See flowsheet documentation for full assessment, interventions and recommendations  Outcome: Progressing  Note:   Prognosis: Good  Problem List: Decreased strength, Decreased endurance, Impaired balance, Decreased mobility, Decreased cognition, Impaired judgement, Decreased safety awareness  Assessment: Pt resting seated OOB in chair at time of PT treatment session  Pt reports " I'm ready to do some activity" and is willing to participate in PT treatment session  Pt able to perform all transfers with min A x 1 this session which is unchanged from previous PT session  Pt continues to require VC and TC for hand placement and safety with all transfers as well as min A x 1 to facilitate proper weight shifts  Slight carryover noted form previous PT session  Pt able to tolerate increased ambulation this session with min A x 1 and the use of a RW  Pt required multiple VC for safety with RW management during ambulation more specifically with direction changes  Pt required seated rest break between ambulation trials  Stair training was performed and pt was able to ascend and descend 3 steps with min A x 1 without loss of balance  Pt tolerated and completed all therex seated OOB in chair  Pt continues to require VC and TC for proper form and pacing with all therex  Pt instructed to perform all seated therex 2-3 times during the day when OOB in chair  Pt assisted back into chair at conclusion of PT session with all needs within reach   Pt denies any further questions at this time  PT will continue to follow  D/C recommendation when medically cleared is rehab due to decreased caregiver support and increased A needed from caregiver at current time  Barriers to Discharge: Decreased caregiver support  Barriers to Discharge Comments: pt lives alone   Recommendation: Short-term skilled PT     PT - OK to Discharge: Yes(when medically cleared )    See flowsheet documentation for full assessment

## 2019-02-19 NOTE — PLAN OF CARE
Problem: Potential for Falls  Goal: Patient will remain free of falls  Description  INTERVENTIONS:  - Assess patient frequently for physical needs  -  Identify cognitive and physical deficits and behaviors that affect risk of falls  -  [place] fall precautions as indicated by assessment   - Educate patient/family on patient safety including physical limitations  - Instruct patient to call for assistance with activity based on assessment  - Modify environment to reduce risk of injury  - Consider OT/PT consult to assist with strengthening/mobility  Outcome: Progressing     Problem: Prexisting or High Potential for Compromised Skin Integrity  Goal: Skin integrity is maintained or improved  Description  INTERVENTIONS:  - Identify patients at risk for skin breakdown  - Assess and monitor skin integrity  - Assess and monitor nutrition and hydration status  - Monitor labs (i e  albumin)  - Assess for incontinence   - Turn and reposition patient  - Assist with mobility/ambulation  - Relieve pressure over bony prominences  - Avoid friction and shearing  - Provide appropriate hygiene as needed including keeping skin clean and dry  - Evaluate need for skin moisturizer/barrier cream  - Collaborate with interdisciplinary team (i e  Nutrition, Rehabilitation, etc )   - Patient/family teaching  Outcome: Progressing     Problem: NEUROSENSORY - ADULT  Goal: Achieves stable or improved neurological status  Description  INTERVENTIONS  - Monitor and report changes in neurological status  - Initiate measures to prevent increased intracranial pressure  - Maintain blood pressure and fluid volume within ordered parameters to optimize cerebral perfusion  - Monitor temperature, glucose, and sodium or any other associated labs   Initiate appropriate interventions as ordered  - Monitor for seizure activity   - Administer anti-seizure medications as ordered  Outcome: Progressing  Goal: Achieves maximal functionality and self care  Description  INTERVENTIONS  - Monitor swallowing and airway patency with patient fatigue and changes in neurological status  - Encourage and assist patient to increase activity and self care with guidance from rehab services  - Encourage visually impaired, hearing impaired and aphasic patients to use assistive/communication devices  Outcome: Progressing     Problem: CARDIOVASCULAR - ADULT  Goal: Maintains optimal cardiac output and hemodynamic stability  Description  INTERVENTIONS:  - Monitor I/O, vital signs and rhythm  - Monitor for S/S and trends of decreased cardiac output i e  bleeding, hypotension  - Administer and titrate ordered vasoactive medications to optimize hemodynamic stability  - Assess quality of pulses, skin color and temperature  - Assess for signs of decreased coronary artery perfusion - ex   Angina  - Instruct patient to report change in severity of symptoms  Outcome: Progressing  Goal: Absence of cardiac dysrhythmias or at baseline rhythm  Description  INTERVENTIONS:  - Continuous cardiac monitoring, monitor vital signs, obtain 12 lead EKG if indicated  - Administer antiarrhythmic and heart rate control medications as ordered  - Monitor electrolytes and administer replacement therapy as ordered  Outcome: Progressing     Problem: GASTROINTESTINAL - ADULT  Goal: Maintains adequate nutritional intake  Description  INTERVENTIONS:  - Monitor percentage of each meal consumed  - Identify factors contributing to decreased intake, treat as appropriate  - Assist with meals as needed  - Monitor I&O, WT and lab values  - Obtain nutrition services referral as needed  Outcome: Progressing     Problem: GENITOURINARY - ADULT  Goal: Absence of urinary retention  Description  INTERVENTIONS:  - Assess patient's ability to void and empty bladder  - Monitor I/O  - Bladder scan as needed  - Discuss with physician/AP medications to alleviate retention as needed  - Discuss catheterization for long term situations as appropriate   Outcome: Progressing     Problem: METABOLIC, FLUID AND ELECTROLYTES - ADULT  Goal: Fluid balance maintained  Description  INTERVENTIONS:  - Monitor labs and assess for signs and symptoms of volume excess or deficit  - Monitor I/O and WT  - Instruct patient on fluid and nutrition as appropriate  Outcome: Progressing     Problem: SKIN/TISSUE INTEGRITY - ADULT  Goal: Skin integrity remains intact  Description  INTERVENTIONS  - Identify patients at risk for skin breakdown  - Assess and monitor skin integrity  - Assess and monitor nutrition and hydration status  - Monitor labs (i e  albumin)  - Assess for incontinence   - Turn and reposition patient  - Assist with mobility/ambulation  - Relieve pressure over bony prominences  - Avoid friction and shearing  - Provide appropriate hygiene as needed including keeping skin clean and dry  - Evaluate need for skin moisturizer/barrier cream  - Collaborate with interdisciplinary team (i e  Nutrition, Rehabilitation, etc )   - Patient/family teaching  Outcome: Progressing     Problem: HEMATOLOGIC - ADULT  Goal: Maintains hematologic stability  Description  INTERVENTIONS  - Assess for signs and symptoms of bleeding or hemorrhage  - Monitor labs  - Administer supportive blood products/factors as ordered and appropriate  Outcome: Progressing     Problem: MUSCULOSKELETAL - ADULT  Goal: Maintain or return mobility to safest level of function  Description  INTERVENTIONS:  - Assess patient's ability to carry out ADLs; assess patient's baseline for ADL function and identify physical deficits which impact ability to perform ADLs (bathing, care of mouth/teeth, toileting, grooming, dressing, etc )  - Assess/evaluate cause of self-care deficits   - Assess range of motion  - Assess patient's mobility; develop plan if impaired  - Assess patient's need for assistive devices and provide as appropriate  - Encourage maximum independence but intervene and supervise when necessary  - Involve family in performance of ADLs  - Assess for home care needs following discharge   - Request OT consult to assist with ADL evaluation and planning for discharge  - Provide patient education as appropriate  Outcome: Progressing     Problem: DISCHARGE PLANNING - CARE MANAGEMENT  Goal: Discharge to post-acute care or home with appropriate resources  Description  INTERVENTIONS:  - Conduct assessment to determine patient/family and health care team treatment goals, and need for post-acute services based on payer coverage, community resources, and patient preferences, and barriers to discharge  - Address psychosocial, clinical, and financial barriers to discharge as identified in assessment in conjunction with the patient/family and health care team  - Arrange appropriate level of post-acute services according to patient's   needs and preference and payer coverage in collaboration with the physician and health care team  - Communicate with and update the patient/family, physician, and health care team regarding progress on the discharge plan  - Arrange appropriate transportation to post-acute venues  -Anticipate d/c to STR vs home with dtr   Outcome: Progressing

## 2019-02-19 NOTE — PROGRESS NOTES
Progress Note - General Surgery   Gregorio Escobedo 81 y o  male MRN: [id number]  Unit/Bed#: [facility] 928-01 Encounter: [id number]    Assessment:  83M with E coli and C  Perfringens sepsis s/p lap cholecystectomy    Plan:  -diet as tolerated  -abx per ID  -analgesia prn  -dvt ppx  -OOB AAT  -dispo planning: STR    Subjective/Objective   Subjective:  Patient feels well and surgical pain was well controlled  He has not had any nausea or vomiting  He says he is having bowel movements and urinating as normal    Objective:   Blood pressure 150/71, pulse 63, temperature 98 4 °F (36 9 °C), resp  rate 20, height 5' 11" (1 803 m), weight 77 3 kg (170 lb 6 4 oz), SpO2 92 %  ,Body mass index is 23 77 kg/m²  Intake/Output Summary (Last 24 hours) at 2/19/2019 0104  Last data filed at 2/18/2019 2338  Gross per 24 hour   Intake 870 ml   Output 2725 ml   Net -1855 ml       Invasive Devices     Peripheral Intravenous Line            Peripheral IV 02/17/19 Right Wrist 1 day                Physical Exam:   General: No acute distress  HEENT: Normocephalic, atraumatic  Neck: Normal ROM  No tracheal deviation  Cardio: Normal rate, regular rhythm  Pulm: Normal respiratory effort  Abdomen: Soft, non-tender, non-distended   Incisions CDI  Extremities: MEDEIROS, No edema  Neuro: Cranial nerves II-XII intact  Psych: Normal affect      Lab, Imaging and other studies:  CBC:   Lab Results   Component Value Date    WBC 9 71 02/18/2019    HGB 9 3 (L) 02/18/2019    HCT 28 2 (L) 02/18/2019     (H) 02/18/2019     02/18/2019    MCH 37 2 (H) 02/18/2019    MCHC 33 0 02/18/2019    RDW 16 2 (H) 02/18/2019    MPV 10 5 02/18/2019    NRBC 0 02/18/2019   , CMP:   Lab Results   Component Value Date    SODIUM 139 02/18/2019    K 3 9 02/18/2019     02/18/2019    CO2 26 02/18/2019    BUN 8 02/18/2019    CREATININE 0 46 (L) 02/18/2019    CALCIUM 7 8 (L) 02/18/2019    AST 28 02/18/2019    ALT 35 02/18/2019    ALKPHOS 115 02/18/2019    EGFR 104 02/18/2019     VTE Pharmacologic Prophylaxis: Heparin  VTE Mechanical Prophylaxis: sequential compression device

## 2019-02-19 NOTE — SOCIAL WORK
Clinicals faxed to [facility] [phone] for SNF auth request for [facility]

## 2019-02-19 NOTE — QUICK NOTE
Nurse-Patient-Provider rounds were completed with the patient's nurse today, Kimberly Ivey  We discussed the plan is to continue diet as tolerated  Continue antibiotics with transition to oral Flagyl and Bactrim per Infectious Disease recommendations  Antibiotics to continue 48 more hours  We reviewed all of the invasive devices/lines/telemetry orders   - None  Pain Assessment / Plan:  - Continue current analgesic regimen as needed  Mobility Assessment / Plan:  - Activity as tolerated with assistance  - PT and OT evaluation and treatment as indicated with recommendations for rehab  Goals / Barriers for discharge:  - Anticipate discharge later today pending insurance authorization for discharge to rehab  - Case management following; case and discharge needs discussed  All questions and concerns were addressed  I spent greater than 12 minutes reviewing the plan with the patient and the nurse, and coordinating care for the day      Alfonso Sena PA-C  2/19/2019 09:18 AM

## 2019-02-19 NOTE — DISCHARGE SUMMARY
Discharge- Sara Carter 1935, 81 y o  male MRN: [id number]    Unit/Bed#: [facility] 928-01 Encounter: [id number]    Primary Care Provider: No primary care provider on file  Date and time admitted to hospital: 2/11/2019  3:40 PM        * Cholangitis  Assessment & Plan  - Cholangitis with E coli and Clostridium bacteremia status post ERCP with sphincterotomy and common bile duct stone extraction on 2/12/2019  Status post laparoscopic cholecystectomy on 2/13/2019   - Diet as tolerated  - Continue current analgesic regimen as needed  - Complete entire course of antibiotics per Infectious Disease  Repeat blood cultures approximately 2 weeks following completion of antibiotic course  - Outpatient follow-up in General surgery Clinic  Cholecystitis  Assessment & Plan  - Cholecystitis with E coli and Clostridium bacteremia status post ERCP with sphincterotomy and common bile duct stone extraction on 2/12/2019  Status post laparoscopic cholecystectomy on 2/13/2019   - Diet as tolerated  - Continue current analgesic regimen as needed  - Complete entire course of antibiotics per Infectious Disease  Repeat blood cultures approximately 2 weeks following completion of antibiotic course  - Outpatient follow-up in General surgery Clinic  Sepsis ([address] )  Assessment & Plan  - Cholangitis / Cholecystitis with E coli and Clostridium bacteremia status post ERCP with sphincterotomy and common bile duct stone extraction on 2/12/2019  Status post laparoscopic cholecystectomy on 2/13/2019   - Diet as tolerated  - Continue current analgesic regimen as needed  - Complete entire course of antibiotics per Infectious Disease  Repeat blood cultures approximately 2 weeks following completion of antibiotic course  - Outpatient follow-up in General surgery Clinic          Discharge Summary - General Surgery   Sara Carter 81 y o  male MRN: [id number]  Unit/Bed#: [facility] 928-01 Encounter: [id number]    Admission Date: 2/11/2019 Discharge Date: 2/19/2019    Admitting Diagnosis: Cholangitis [K83 09]  Hyperbilirubinemia [E80 6]  Cholecystitis [K81 9]  Altered mental status [R41 82]  RUQ pain [R10 11]  Transaminitis [R74 0]    Discharge Diagnosis: See above  Attending and Service: [facility]  Consulting Physician(s):     1  [facility]  2  Geriatrics  3  Infectious Disease  4  Podiatry  Imaging and Procedures Performed:   Orders Placed This Encounter   Procedures    Critical Care     Xr Chest 2 Views    Result Date: 2/11/2019  Impression: Minimal right base atelectasis  Workstation performed: [id number]     Ct Head Without Contrast    Result Date: 2/11/2019  Impression: No acute intracranial abnormality  Mild chronic small vessel ischemic changes and volume loss  Workstation performed: [id number]     Fl Ercp Biliary Only    Result Date: 2/12/2019  Impression: Choledocholithiasis with mild biliary ductal dilatation  Please see procedure note for further details  Workstation performed: [id number]     Us Right Upper Quadrant    Result Date: 2/11/2019  Impression: 2 2 cm gallbladder neck gallstone with gallbladder wall thickening and pericholecystic fluid, correlate for acute cholecystitis  9 mm common bile duct  Workstation performed: [id number]     Ct Abdomen Pelvis With Contrast    Result Date: 2/11/2019  Impression: Cholelithiasis with a distended gallbladder and mild diffuse wall thickening  The findings are suspicious for acute cholecystitis  Correlation with laboratory studies is recommended  A right upper quadrant ultrasound could be performed for further evaluation  No free air or free fluid  Workstation performed: [id number]     ERCP with sphincterotomy and common bile duct stone extraction on 02/12/2019  Laparoscopic cholecystectomy on 02/13/2019  Pathology: Final surgical pathology pending at the time of discharge      Hospital Course: Giselle Hernandez is a 52-year-old male who presented with confusion and jaundice  On his initial surgical evaluation, he was noted to have scleral icterus and was confused; the remainder of his exam was unremarkable  His initial workup was reviewed including the above-noted imaging studies and his labs  He was admitted to the acute care surgery service in the intensive care unit with cholangitis  He was kept NPO, started on IV fluid resuscitation and broad-spectrum IV antibiotics, GI was consulted for evaluation he for ERCP  The GI service evaluated the patient and discussed intervention with the patient's family  His he went on to have an ERCP with sphincterotomy and extraction of a common bile duct stone on 02/12/2019  The following day, on 02/13/2018, he underwent a laparoscopic cholecystectomy  There were no intraoperative complications  Postoperatively, he continued to receive antibiotics and Infectious Disease was consulted after his blood cultures returned positive for E coli and Clostridium perfringens  Additional consultations to the geriatric service and Podiatry service were placed during his hospitalization and the assisted in his care  Once stable from a critical care standpoint, he was transferred to a [facility] floor where he continued to recover  PT and OT evaluated the patient and recommended transition to rehab when medically appropriate  Case Management assisted with disposition planning  By 02/19/2018, he was deemed stable for discharge  On discharge, the patient is instructed to follow-up with the patient's primary care provider within the next 2 weeks to review the events of the recent hospitalization  The patient is instructed to follow-up with the [facility] as scheduled on 3/1/2019 at 10 AM  The patient is instructed to follow the provided discharge instructions       Condition at Discharge: good     Discharge instructions/Information to patient and family:   See after visit summary for information provided to patient and family  Provisions for Follow-Up Care:  See after visit summary for information related to follow-up care and any pertinent home health orders  Disposition: See After Visit Summary for discharge disposition information  Planned Readmission: No    Discharge Statement   I spent 30 minutes discharging the patient  This time was spent on the day of discharge  I had direct contact with the patient on the day of discharge  Additional documentation is required if more than 30 minutes were spent on discharge  Discharge Medications:  See after visit summary for reconciled discharge medications provided to patient and family      Jennie Gonzalez PA-C  2/20/2019  3:15 PM

## 2019-02-20 NOTE — ASSESSMENT & PLAN NOTE
- Cholangitis with E coli and Clostridium bacteremia status post ERCP with sphincterotomy and common bile duct stone extraction on 2/12/2019  Status post laparoscopic cholecystectomy on 2/13/2019   - Diet as tolerated  - Continue current analgesic regimen as needed  - Complete entire course of antibiotics per Infectious Disease  Repeat blood cultures approximately 2 weeks following completion of antibiotic course  - Outpatient follow-up in General surgery Clinic

## 2019-02-20 NOTE — ASSESSMENT & PLAN NOTE
- Cholangitis / Cholecystitis with E coli and Clostridium bacteremia status post ERCP with sphincterotomy and common bile duct stone extraction on 2/12/2019  Status post laparoscopic cholecystectomy on 2/13/2019   - Diet as tolerated  - Continue current analgesic regimen as needed  - Complete entire course of antibiotics per Infectious Disease  Repeat blood cultures approximately 2 weeks following completion of antibiotic course  - Outpatient follow-up in General surgery Clinic

## 2019-02-20 NOTE — ASSESSMENT & PLAN NOTE
- Cholecystitis with E coli and Clostridium bacteremia status post ERCP with sphincterotomy and common bile duct stone extraction on 2/12/2019  Status post laparoscopic cholecystectomy on 2/13/2019   - Diet as tolerated  - Continue current analgesic regimen as needed  - Complete entire course of antibiotics per Infectious Disease  Repeat blood cultures approximately 2 weeks following completion of antibiotic course  - Outpatient follow-up in General surgery Clinic

## 2019-02-21 LAB
BACTERIA BLD CULT: NORMAL
BACTERIA BLD CULT: NORMAL

## 2019-02-25 NOTE — TELEPHONE ENCOUNTER
All numbers in chart are disconnected as well as emergency contact   I will mail a letter and try to contact next week

## 2019-03-01 ENCOUNTER — OFFICE VISIT (OUTPATIENT)
Dept: SURGERY | Facility: CLINIC | Age: 84
End: 2019-03-01

## 2019-03-01 ENCOUNTER — TELEPHONE (OUTPATIENT)
Dept: SURGERY | Facility: CLINIC | Age: 84
End: 2019-03-01

## 2019-03-01 VITALS — DIASTOLIC BLOOD PRESSURE: 74 MMHG | TEMPERATURE: 98.3 F | SYSTOLIC BLOOD PRESSURE: 134 MMHG

## 2019-03-01 DIAGNOSIS — Z09 POSTOP CHECK: ICD-10-CM

## 2019-03-01 DIAGNOSIS — Z90.49 STATUS POST CHOLECYSTECTOMY: Primary | ICD-10-CM

## 2019-03-01 PROCEDURE — 99024 POSTOP FOLLOW-UP VISIT: CPT | Performed by: SURGERY

## 2019-03-01 NOTE — TELEPHONE ENCOUNTER
Mr Hector Ortiz was seen today follow up lap josé luis Hedrick at [facility] called regarding the f/u instructions alexy state no heavy activity for 4 weeks  Yesica Hedrick states that since the patient will be having PT she needs specifics of what heavy activity means  Please advise and call [phone] ext [phone]/ fax: [phone] [facility]

## 2019-03-02 PROBLEM — Z90.49 STATUS POST CHOLECYSTECTOMY: Status: ACTIVE | Noted: 2019-03-02

## 2019-03-02 NOTE — PROGRESS NOTES
GENERAL SURGERY POST-OP NOTE  Dre Hammond   MRN: [id number]   : 1935  3/2/2019  Chief Complaint   Patient presents with    Post-op     p/o lap josé luis, ERCP     Assessment/Plan   Diagnoses and all orders for this visit:    Status post cholecystectomy    Postop check      Patient is doing well status post-operative laparoscopic cholecystectomy  Post-operative care restrictions discussed  May return to work when ready  he is progressing nicely  I will see him back on an as needed basis  Subjective   The patient is a 81 y o  male who presents for routine post-operative follow up status post open cholecystectomy  He denies any present complaints  Activity level has been appropriate  He is tolerating a regular diet  Pain is well-controlled with current therapy  The following portions of the patient's history were reviewed by a provider in this encounter and updated as appropriate:   No text in SmartText          Objective   Physical Exam:  /74 (BP Location: Right arm, Patient Position: Sitting, Cuff Size: Standard)   Temp 98 3 °F (36 8 °C) (Tympanic)   Constitutional: not in acute distress, well developed and well nourished  Abdomen: soft, bowel sounds active, non-tender, no abnormal masses  Incision: healing well, no drainage, no erythema, well approximated  Tenderness at incision site: mild    Current Outpatient Medications   Medication Sig Dispense Refill    acetaminophen (TYLENOL) 325 mg tablet Take 2 tablets (650 mg total) by mouth every 6 (six) hours as needed for mild pain for up to 30 days Do not exceed 4 g daily  30 tablet 0    aspirin (ECOTRIN LOW STRENGTH) 81 mg EC tablet TAKE 1 TABLET (81 MG TOTAL) BY MOUTH DAILY        escitalopram (LEXAPRO) 10 mg tablet Take 10 mg by mouth      folic acid (FOLVITE) 860 MCG tablet Take 0 5 tablets (400 mcg total) by mouth daily for 30 days 15 tablet 0    senna-docusate sodium (SENOKOT S) 8 6-50 mg per tablet Take 1 tablet by mouth daily at bedtime 30 tablet 0    tamsulosin (FLOMAX) 0 4 mg Take 0 4 mg by mouth      cyanocobalamin 1,000 mcg/mL Inject 1 mL (1,000 mcg total) into a muscle every other day for 2 doses 2 mL 0     No current facility-administered medications for this visit  Patient has no known allergies  History reviewed  No pertinent past medical history      [unfilled]

## 2019-03-05 NOTE — TELEPHONE ENCOUNTER
I called [facility] to f/u with specific instructions regarding his activity per Dr Zenaida Pierson  I spoke with Tayler Peoples at [facility] and was told that the family came on 3/1/19 to release him to their care in [place]

## 2019-04-25 ENCOUNTER — ANESTHESIA EVENT (OUTPATIENT)
Dept: GASTROENTEROLOGY | Facility: HOSPITAL | Age: 84
End: 2019-04-25

## 2019-04-26 ENCOUNTER — ANESTHESIA (OUTPATIENT)
Dept: GASTROENTEROLOGY | Facility: HOSPITAL | Age: 84
End: 2019-04-26

## 2024-02-27 NOTE — ANESTHESIA POSTPROCEDURE EVALUATION
Post-Op Assessment Note    CV Status:  Stable  Pain Score: 0    Pain management: adequate     Mental Status:  Alert and awake   Hydration Status:  Euvolemic and stable   PONV Controlled:  None   Airway Patency:  Patent   Post Op Vitals Reviewed: Yes      Staff: AnesthesiologistGUILLAUME           /75 (02/12/19 1521)    Temp 98 7 °F (37 1 °C) (02/12/19 1516)    Pulse 75 (02/12/19 1521)   Resp 16 (02/12/19 1521)    SpO2 99 % (02/12/19 1521) Mirvaso Counseling: Mirvaso is a topical medication which can decrease superficial blood flow where applied. Side effects are uncommon and include stinging, redness and allergic reactions. Simponi Counseling:  I discussed with the patient the risks of golimumab including but not limited to myelosuppression, immunosuppression, autoimmune hepatitis, demyelinating diseases, lymphoma, and serious infections.  The patient understands that monitoring is required including a PPD at baseline and must alert us or the primary physician if symptoms of infection or other concerning signs are noted. Low Dose Naltrexone Pregnancy And Lactation Text: Naltrexone is pregnancy category C.  There have been no adequate and well-controlled studies in pregnant women.  It should be used in pregnancy only if the potential benefit justifies the potential risk to the fetus.   Limited data indicates that naltrexone is minimally excreted into breastmilk. Calcipotriene Counseling:  I discussed with the patient the risks of calcipotriene including but not limited to erythema, scaling, itching, and irritation. Terbinafine Counseling: Patient counseling regarding adverse effects of terbinafine including but not limited to headache, diarrhea, rash, upset stomach, liver function test abnormalities, itching, taste/smell disturbance, nausea, abdominal pain, and flatulence.  There is a rare possibility of liver failure that can occur when taking terbinafine.  The patient understands that a baseline LFT and kidney function test may be required. The patient verbalized understanding of the proper use and possible adverse effects of terbinafine.  All of the patient's questions and concerns were addressed. Tetracycline Pregnancy And Lactation Text: This medication is Pregnancy Category D and not consider safe during pregnancy. It is also excreted in breast milk. Azathioprine Counseling:  I discussed with the patient the risks of azathioprine including but not limited to myelosuppression, immunosuppression, hepatotoxicity, lymphoma, and infections.  The patient understands that monitoring is required including baseline LFTs, Creatinine, possible TPMP genotyping and weekly CBCs for the first month and then every 2 weeks thereafter.  The patient verbalized understanding of the proper use and possible adverse effects of azathioprine.  All of the patient's questions and concerns were addressed. Isotretinoin Pregnancy And Lactation Text: This medication is Pregnancy Category X and is considered extremely dangerous during pregnancy. It is unknown if it is excreted in breast milk. Rhofade Pregnancy And Lactation Text: This medication has not been assigned a Pregnancy Risk Category. It is unknown if the medication is excreted in breast milk. Topical Ketoconazole Counseling: Patient counseled that this medication may cause skin irritation or allergic reactions.  In the event of skin irritation, the patient was advised to reduce the amount of the drug applied or use it less frequently.   The patient verbalized understanding of the proper use and possible adverse effects of ketoconazole.  All of the patient's questions and concerns were addressed. Minocycline Counseling: Patient advised regarding possible photosensitivity and discoloration of the teeth, skin, lips, tongue and gums.  Patient instructed to avoid sunlight, if possible.  When exposed to sunlight, patients should wear protective clothing, sunglasses, and sunscreen.  The patient was instructed to call the office immediately if the following severe adverse effects occur:  hearing changes, easy bruising/bleeding, severe headache, or vision changes.  The patient verbalized understanding of the proper use and possible adverse effects of minocycline.  All of the patient's questions and concerns were addressed. Topical Ketoconazole Pregnancy And Lactation Text: This medication is Pregnancy Category B and is considered safe during pregnancy. It is unknown if it is excreted in breast milk. Albendazole Counseling:  I discussed with the patient the risks of albendazole including but not limited to cytopenia, kidney damage, nausea/vomiting and severe allergy.  The patient understands that this medication is being used in an off-label manner. Azathioprine Pregnancy And Lactation Text: This medication is Pregnancy Category D and isn't considered safe during pregnancy. It is unknown if this medication is excreted in breast milk. Sotyktu Pregnancy And Lactation Text: There is insufficient data to evaluate whether or not Sotyktu is safe to use during pregnancy.   It is not known if Sotyktu passes into breast milk and whether or not it is safe to use when breastfeeding.   Doxycycline Counseling:  Patient counseled regarding possible photosensitivity and increased risk for sunburn.  Patient instructed to avoid sunlight, if possible.  When exposed to sunlight, patients should wear protective clothing, sunglasses, and sunscreen.  The patient was instructed to call the office immediately if the following severe adverse effects occur:  hearing changes, easy bruising/bleeding, severe headache, or vision changes.  The patient verbalized understanding of the proper use and possible adverse effects of doxycycline.  All of the patient's questions and concerns were addressed. Valtrex Counseling: I discussed with the patient the risks of valacyclovir including but not limited to kidney damage, nausea, vomiting and severe allergy.  The patient understands that if the infection seems to be worsening or is not improving, they are to call. Dutasteride Pregnancy And Lactation Text: This medication is absolutely contraindicated in women, especially during pregnancy and breast feeding. Feminization of male fetuses is possible if taking while pregnant. Cimzia Counseling:  I discussed with the patient the risks of Cimzia including but not limited to immunosuppression, allergic reactions and infections.  The patient understands that monitoring is required including a PPD at baseline and must alert us or the primary physician if symptoms of infection or other concerning signs are noted. Prednisone Counseling:  I discussed with the patient the risks of prolonged use of prednisone including but not limited to weight gain, insomnia, osteoporosis, mood changes, diabetes, susceptibility to infection, glaucoma and high blood pressure.  In cases where prednisone use is prolonged, patients should be monitored with blood pressure checks, serum glucose levels and an eye exam.  Additionally, the patient may need to be placed on GI prophylaxis, PCP prophylaxis, and calcium and vitamin D supplementation and/or a bisphosphonate.  The patient verbalized understanding of the proper use and the possible adverse effects of prednisone.  All of the patient's questions and concerns were addressed. Elidel Pregnancy And Lactation Text: This medication is Pregnancy Category C. It is unknown if this medication is excreted in breast milk. Humira Pregnancy And Lactation Text: This medication is Pregnancy Category B and is considered safe during pregnancy. It is unknown if this medication is excreted in breast milk. Dapsone Counseling: I discussed with the patient the risks of dapsone including but not limited to hemolytic anemia, agranulocytosis, rashes, methemoglobinemia, kidney failure, peripheral neuropathy, headaches, GI upset, and liver toxicity.  Patients who start dapsone require monitoring including baseline LFTs and weekly CBCs for the first month, then every month thereafter.  The patient verbalized understanding of the proper use and possible adverse effects of dapsone.  All of the patient's questions and concerns were addressed. Dapsone Pregnancy And Lactation Text: This medication is Pregnancy Category C and is not considered safe during pregnancy or breast feeding. Ilumya Counseling: I discussed with the patient the risks of tildrakizumab including but not limited to immunosuppression, malignancy, posterior leukoencephalopathy syndrome, and serious infections.  The patient understands that monitoring is required including a PPD at baseline and must alert us or the primary physician if symptoms of infection or other concerning signs are noted. Calcipotriene Pregnancy And Lactation Text: This medication has not been proven safe during pregnancy. It is unknown if this medication is excreted in breast milk. Fluconazole Counseling:  Patient counseled regarding adverse effects of fluconazole including but not limited to headache, diarrhea, nausea, upset stomach, liver function test abnormalities, taste disturbance, and stomach pain.  There is a rare possibility of liver failure that can occur when taking fluconazole.  The patient understands that monitoring of LFTs and kidney function test may be required, especially at baseline. The patient verbalized understanding of the proper use and possible adverse effects of fluconazole.  All of the patient's questions and concerns were addressed. Simponi Pregnancy And Lactation Text: The risk during pregnancy and breastfeeding is uncertain with this medication. Finasteride Male Counseling: Finasteride Counseling:  I discussed with the patient the risks of use of finasteride including but not limited to decreased libido, decreased ejaculate volume, gynecomastia, and depression. Women should not handle medication.  All of the patient's questions and concerns were addressed. Opioid Counseling: I discussed with the patient the potential side effects of opioids including but not limited to addiction, altered mental status, and depression. I stressed avoiding alcohol, benzodiazepines, muscle relaxants and sleep aids unless specifically okayed by a physician. The patient verbalized understanding of the proper use and possible adverse effects of opioids. All of the patient's questions and concerns were addressed. They were instructed to flush the remaining pills down the toilet if they did not need them for pain. Niacinamide Counseling: I recommended taking niacin or niacinamide, also know as vitamin B3, twice daily. Recent evidence suggests that taking vitamin B3 (500 mg twice daily) can reduce the risk of actinic keratoses and non-melanoma skin cancers. Side effects of vitamin B3 include flushing and headache. High Dose Vitamin A Counseling: Side effects reviewed, pt to contact office should one occur. Erivedge Counseling- I discussed with the patient the risks of Erivedge including but not limited to nausea, vomiting, diarrhea, constipation, weight loss, changes in the sense of taste, decreased appetite, muscle spasms, and hair loss.  The patient verbalized understanding of the proper use and possible adverse effects of Erivedge.  All of the patient's questions and concerns were addressed. Solaraze Counseling:  I discussed with the patient the risks of Solaraze including but not limited to erythema, scaling, itching, weeping, crusting, and pain. Albendazole Pregnancy And Lactation Text: This medication is Pregnancy Category C and it isn't known if it is safe during pregnancy. It is also excreted in breast milk. Quinolones Counseling:  I discussed with the patient the risks of fluoroquinolones including but not limited to GI upset, allergic reaction, drug rash, diarrhea, dizziness, photosensitivity, yeast infections, liver function test abnormalities, tendonitis/tendon rupture. Propranolol Counseling:  I discussed with the patient the risks of propranolol including but not limited to low heart rate, low blood pressure, low blood sugar, restlessness and increased cold sensitivity. They should call the office if they experience any of these side effects. Opioid Pregnancy And Lactation Text: These medications can lead to premature delivery and should be avoided during pregnancy. These medications are also present in breast milk in small amounts. Xeljanz Counseling: I discussed with the patient the risks of Xeljanz therapy including increased risk of infection, liver issues, headache, diarrhea, or cold symptoms. Live vaccines should be avoided. They were instructed to call if they have any problems. Doxycycline Pregnancy And Lactation Text: This medication is Pregnancy Category D and not consider safe during pregnancy. It is also excreted in breast milk but is considered safe for shorter treatment courses. Azithromycin Counseling:  I discussed with the patient the risks of azithromycin including but not limited to GI upset, allergic reaction, drug rash, diarrhea, and yeast infections. Valtrex Pregnancy And Lactation Text: this medication is Pregnancy Category B and is considered safe during pregnancy. This medication is not directly found in breast milk but it's metabolite acyclovir is present. Topical Sulfur Applications Counseling: Topical Sulfur Counseling: Patient counseled that this medication may cause skin irritation or allergic reactions.  In the event of skin irritation, the patient was advised to reduce the amount of the drug applied or use it less frequently.   The patient verbalized understanding of the proper use and possible adverse effects of topical sulfur application.  All of the patient's questions and concerns were addressed. Eucrisa Counseling: Patient may experience a mild burning sensation during topical application. Eucrisa is not approved in children less than 3 months of age. Prednisone Pregnancy And Lactation Text: This medication is Pregnancy Category C and it isn't know if it is safe during pregnancy. This medication is excreted in breast milk. Cellcept Counseling:  I discussed with the patient the risks of mycophenolate mofetil including but not limited to infection/immunosuppression, GI upset, hypokalemia, hypercholesterolemia, bone marrow suppression, lymphoproliferative disorders, malignancy, GI ulceration/bleed/perforation, colitis, interstitial lung disease, kidney failure, progressive multifocal leukoencephalopathy, and birth defects.  The patient understands that monitoring is required including a baseline creatinine and regular CBC testing. In addition, patient must alert us immediately if symptoms of infection or other concerning signs are noted. Cimzia Pregnancy And Lactation Text: This medication crosses the placenta but can be considered safe in certain situations. Cimzia may be excreted in breast milk. Eucrisa Pregnancy And Lactation Text: This medication has not been assigned a Pregnancy Risk Category but animal studies failed to show danger with the topical medication. It is unknown if the medication is excreted in breast milk. Gabapentin Counseling: I discussed with the patient the risks of gabapentin including but not limited to dizziness, somnolence, fatigue and ataxia. Finasteride Female Counseling: Finasteride Counseling:  I discussed with the patient the risks of use of finasteride including but not limited to decreased libido and sexual dysfunction. Explained the teratogenic nature of the medication and stressed the importance of not getting pregnant during treatment. All of the patient's questions and concerns were addressed. 5-Fu Counseling: 5-Fluorouracil Counseling:  I discussed with the patient the risks of 5-fluorouracil including but not limited to erythema, scaling, itching, weeping, crusting, and pain. Fluconazole Pregnancy And Lactation Text: This medication is Pregnancy Category C and it isn't know if it is safe during pregnancy. It is also excreted in breast milk. Terbinafine Pregnancy And Lactation Text: This medication is Pregnancy Category B and is considered safe during pregnancy. It is also excreted in breast milk and breast feeding isn't recommended. Erivedge Pregnancy And Lactation Text: This medication is Pregnancy Category X and is absolutely contraindicated during pregnancy. It is unknown if it is excreted in breast milk. Solaraze Pregnancy And Lactation Text: This medication is Pregnancy Category B and is considered safe. There is some data to suggest avoiding during the third trimester. It is unknown if this medication is excreted in breast milk. High Dose Vitamin A Pregnancy And Lactation Text: High dose vitamin A therapy is contraindicated during pregnancy and breast feeding. Opzelura Counseling:  I discussed with the patient the risks of Opzelura including but not limited to nasopharngitis, bronchitis, ear infection, eosinophila, hives, diarrhea, folliculitis, tonsillitis, and rhinorrhea.  Taken orally, this medication has been linked to serious infections; higher rate of mortality; malignancy and lymphoproliferative disorders; major adverse cardiovascular events; thrombosis; thrombocytopenia, anemia, and neutropenia; and lipid elevations. Skyrizi Counseling: I discussed with the patient the risks of risankizumab-rzaa including but not limited to immunosuppression, and serious infections.  The patient understands that monitoring is required including a PPD at baseline and must alert us or the primary physician if symptoms of infection or other concerning signs are noted. Niacinamide Pregnancy And Lactation Text: These medications are considered safe during pregnancy. Topical Retinoid counseling:  Patient advised to apply a pea-sized amount only at bedtime and wait 30 minutes after washing their face before applying.  If too drying, patient may add a non-comedogenic moisturizer. The patient verbalized understanding of the proper use and possible adverse effects of retinoids.  All of the patient's questions and concerns were addressed. Use Enhanced Medication Counseling?: No Cibinqo Counseling: I discussed with the patient the risks of Cibinqo therapy including but not limited to common cold, nausea, headache, cold sores, increased blood CPK levels, dizziness, UTIs, fatigue, acne, and vomitting. Live vaccines should be avoided.  This medication has been linked to serious infections; higher rate of mortality; malignancy and lymphoproliferative disorders; major adverse cardiovascular events; thrombosis; thrombocytopenia and lymphopenia; lipid elevations; and retinal detachment. Azithromycin Pregnancy And Lactation Text: This medication is considered safe during pregnancy and is also secreted in breast milk. Ivermectin Counseling:  Patient instructed to take medication on an empty stomach with a full glass of water.  Patient informed of potential adverse effects including but not limited to nausea, diarrhea, dizziness, itching, and swelling of the extremities or lymph nodes.  The patient verbalized understanding of the proper use and possible adverse effects of ivermectin.  All of the patient's questions and concerns were addressed. Griseofulvin Counseling:  I discussed with the patient the risks of griseofulvin including but not limited to photosensitivity, cytopenia, liver damage, nausea/vomiting and severe allergy.  The patient understands that this medication is best absorbed when taken with a fatty meal (e.g., ice cream or french fries). Erythromycin Counseling:  I discussed with the patient the risks of erythromycin including but not limited to GI upset, allergic reaction, drug rash, diarrhea, increase in liver enzymes, and yeast infections. Propranolol Pregnancy And Lactation Text: This medication is Pregnancy Category C and it isn't known if it is safe during pregnancy. It is excreted in breast milk. Xeljameyz Pregnancy And Lactation Text: This medication is Pregnancy Category D and is not considered safe during pregnancy.  The risk during breast feeding is also uncertain. Topical Sulfur Applications Pregnancy And Lactation Text: This medication is considered safe during pregnancy and breast feeding secondary to limited systemic absorption. Cosentyx Counseling:  I discussed with the patient the risks of Cosentyx including but not limited to worsening of Crohn's disease, immunosuppression, allergic reactions and infections.  The patient understands that monitoring is required including a PPD at baseline and must alert us or the primary physician if symptoms of infection or other concerning signs are noted. Arava Counseling:  Patient counseled regarding adverse effects of Arava including but not limited to nausea, vomiting, abnormalities in liver function tests. Patients may develop mouth sores, rash, diarrhea, and abnormalities in blood counts. The patient understands that monitoring is required including LFTs and blood counts.  There is a rare possibility of scarring of the liver and lung problems that can occur when taking methotrexate. Persistent nausea, loss of appetite, pale stools, dark urine, cough, and shortness of breath should be reported immediately. Patient advised to discontinue Arava treatment and consult with a physician prior to attempting conception. The patient will have to undergo a treatment to eliminate Arava from the body prior to conception. Cyclophosphamide Counseling:  I discussed with the patient the risks of cyclophosphamide including but not limited to hair loss, hormonal abnormalities, decreased fertility, abdominal pain, diarrhea, nausea and vomiting, bone marrow suppression and infection. The patient understands that monitoring is required while taking this medication. Cimetidine Counseling:  I discussed with the patient the risks of Cimetidine including but not limited to gynecomastia, headache, diarrhea, nausea, drowsiness, arrhythmias, pancreatitis, skin rashes, psychosis, bone marrow suppression and kidney toxicity. Azelaic Acid Counseling: Patient counseled that medicine may cause skin irritation and to avoid applying near the eyes.  In the event of skin irritation, the patient was advised to reduce the amount of the drug applied or use it less frequently.   The patient verbalized understanding of the proper use and possible adverse effects of azelaic acid.  All of the patient's questions and concerns were addressed. Infliximab Counseling:  I discussed with the patient the risks of infliximab including but not limited to myelosuppression, immunosuppression, autoimmune hepatitis, demyelinating diseases, lymphoma, and serious infections.  The patient understands that monitoring is required including a PPD at baseline and must alert us or the primary physician if symptoms of infection or other concerning signs are noted. SSKI Counseling:  I discussed with the patient the risks of SSKI including but not limited to thyroid abnormalities, metallic taste, GI upset, fever, headache, acne, arthralgias, paraesthesias, lymphadenopathy, easy bleeding, arrhythmias, and allergic reaction. Finasteride Pregnancy And Lactation Text: This medication is absolutely contraindicated during pregnancy. It is unknown if it is excreted in breast milk. Opzelura Pregnancy And Lactation Text: There is insufficient data to evaluate drug-associated risk for major birth defects, miscarriage, or other adverse maternal or fetal outcomes.  There is a pregnancy registry that monitors pregnancy outcomes in pregnant persons exposed to the medication during pregnancy.  It is unknown if this medication is excreted in breast milk.  Do not breastfeed during treatment and for about 4 weeks after the last dose. Libtayo Counseling- I discussed with the patient the risks of Libtayo including but not limited to nausea, vomiting, diarrhea, and bone or muscle pain.  The patient verbalized understanding of the proper use and possible adverse effects of Libtayo.  All of the patient's questions and concerns were addressed. Gabapentin Pregnancy And Lactation Text: This medication is Pregnancy Category C and isn't considered safe during pregnancy. It is excreted in breast milk. Hydroquinone Counseling:  Patient advised that medication may result in skin irritation, lightening (hypopigmentation), dryness, and burning.  In the event of skin irritation, the patient was advised to reduce the amount of the drug applied or use it less frequently.  Rarely, spots that are treated with hydroquinone can become darker (pseudoochronosis).  Should this occur, patient instructed to stop medication and call the office. The patient verbalized understanding of the proper use and possible adverse effects of hydroquinone.  All of the patient's questions and concerns were addressed. Nsaids Counseling: NSAID Counseling: I discussed with the patient that NSAIDs should be taken with food. Prolonged use of NSAIDs can result in the development of stomach ulcers.  Patient advised to stop taking NSAIDs if abdominal pain occurs.  The patient verbalized understanding of the proper use and possible adverse effects of NSAIDs.  All of the patient's questions and concerns were addressed. 5-Fu Pregnancy And Lactation Text: This medication is Pregnancy Category X and contraindicated in pregnancy and in women who may become pregnant. It is unknown if this medication is excreted in breast milk. Picato Counseling:  I discussed with the patient the risks of Picato including but not limited to erythema, scaling, itching, weeping, crusting, and pain. Libtayo Pregnancy And Lactation Text: This medication is contraindicated in pregnancy and when breast feeding. Acitretin Counseling:  I discussed with the patient the risks of acitretin including but not limited to hair loss, dry lips/skin/eyes, liver damage, hyperlipidemia, depression/suicidal ideation, photosensitivity.  Serious rare side effects can include but are not limited to pancreatitis, pseudotumor cerebri, bony changes, clot formation/stroke/heart attack.  Patient understands that alcohol is contraindicated since it can result in liver toxicity and significantly prolong the elimination of the drug by many years. Nsaids Pregnancy And Lactation Text: These medications are considered safe up to 30 weeks gestation. It is excreted in breast milk. Stelara Counseling:  I discussed with the patient the risks of ustekinumab including but not limited to immunosuppression, malignancy, posterior leukoencephalopathy syndrome, and serious infections.  The patient understands that monitoring is required including a PPD at baseline and must alert us or the primary physician if symptoms of infection or other concerning signs are noted. Bactrim Counseling:  I discussed with the patient the risks of sulfa antibiotics including but not limited to GI upset, allergic reaction, drug rash, diarrhea, dizziness, photosensitivity, and yeast infections.  Rarely, more serious reactions can occur including but not limited to aplastic anemia, agranulocytosis, methemoglobinemia, blood dyscrasias, liver or kidney failure, lung infiltrates or desquamative/blistering drug rashes. Griseofulvin Pregnancy And Lactation Text: This medication is Pregnancy Category X and is known to cause serious birth defects. It is unknown if this medication is excreted in breast milk but breast feeding should be avoided. Cibinqo Pregnancy And Lactation Text: It is unknown if this medication will adversely affect pregnancy or breast feeding.  You should not take this medication if you are currently pregnant or planning a pregnancy or while breastfeeding. Olanzapine Pregnancy And Lactation Text: This medication is pregnancy category C.   There are no adequate and well controlled trials with olanzapine in pregnant females.  Olanzapine should be used during pregnancy only if the potential benefit justifies the potential risk to the fetus.   In a study in lactating healthy women, olanzapine was excreted in breast milk.  It is recommended that women taking olanzapine should not breast feed. Detail Level: Zone Wartpeel Counseling:  I discussed with the patient the risks of Wartpeel including but not limited to erythema, scaling, itching, weeping, crusting, and pain. Rifampin Counseling: I discussed with the patient the risks of rifampin including but not limited to liver damage, kidney damage, red-orange body fluids, nausea/vomiting and severe allergy. Cyclophosphamide Pregnancy And Lactation Text: This medication is Pregnancy Category D and it isn't considered safe during pregnancy. This medication is excreted in breast milk. Dupixent Counseling: I discussed with the patient the risks of dupilumab including but not limited to eye infection and irritation, cold sores, injection site reactions, worsening of asthma, allergic reactions and increased risk of parasitic infection.  Live vaccines should be avoided while taking dupilumab. Dupilumab will also interact with certain medications such as warfarin and cyclosporine. The patient understands that monitoring is required and they must alert us or the primary physician if symptoms of infection or other concerning signs are noted. Oral Minoxidil Counseling- I discussed with the patient the risks of oral minoxidil including but not limited to shortness of breath, swelling of the feet or ankles, dizziness, lightheadedness, unwanted hair growth and allergic reaction.  The patient verbalized understanding of the proper use and possible adverse effects of oral minoxidil.  All of the patient's questions and concerns were addressed. Olumiant Counseling: I discussed with the patient the risks of Olumiant therapy including but not limited to upper respiratory tract infections, shingles, cold sores, and nausea. Live vaccines should be avoided.  This medication has been linked to serious infections; higher rate of mortality; malignancy and lymphoproliferative disorders; major adverse cardiovascular events; thrombosis; gastrointestinal perforations; neutropenia; lymphopenia; anemia; liver enzyme elevations; and lipid elevations. Azelaic Acid Pregnancy And Lactation Text: This medication is considered safe during pregnancy and breast feeding. Birth Control Pills Counseling: Birth Control Pill Counseling: I discussed with the patient the potential side effects of OCPs including but not limited to increased risk of stroke, heart attack, thrombophlebitis, deep venous thrombosis, hepatic adenomas, breast changes, GI upset, headaches, and depression.  The patient verbalized understanding of the proper use and possible adverse effects of OCPs. All of the patient's questions and concerns were addressed. Sski Pregnancy And Lactation Text: This medication is Pregnancy Category D and isn't considered safe during pregnancy. It is excreted in breast milk. Glycopyrrolate Counseling:  I discussed with the patient the risks of glycopyrrolate including but not limited to skin rash, drowsiness, dry mouth, difficulty urinating, and blurred vision. Glycopyrrolate Pregnancy And Lactation Text: This medication is Pregnancy Category B and is considered safe during pregnancy. It is unknown if it is excreted breast milk. Rituxan Counseling:  I discussed with the patient the risks of Rituxan infusions. Side effects can include infusion reactions, severe drug rashes including mucocutaneous reactions, reactivation of latent hepatitis and other infections and rarely progressive multifocal leukoencephalopathy.  All of the patient's questions and concerns were addressed. Itraconazole Counseling:  I discussed with the patient the risks of itraconazole including but not limited to liver damage, nausea/vomiting, neuropathy, and severe allergy.  The patient understands that this medication is best absorbed when taken with acidic beverages such as non-diet cola or ginger ale.  The patient understands that monitoring is required including baseline LFTs and repeat LFTs at intervals.  The patient understands that they are to contact us or the primary physician if concerning signs are noted. Birth Control Pills Pregnancy And Lactation Text: This medication should be avoided if pregnant and for the first 30 days post-partum. Doxepin Counseling:  Patient advised that the medication is sedating and not to drive a car after taking this medication. Patient informed of potential adverse effects including but not limited to dry mouth, urinary retention, and blurry vision.  The patient verbalized understanding of the proper use and possible adverse effects of doxepin.  All of the patient's questions and concerns were addressed. Benzoyl Peroxide Counseling: Patient counseled that medicine may cause skin irritation and bleach clothing.  In the event of skin irritation, the patient was advised to reduce the amount of the drug applied or use it less frequently.   The patient verbalized understanding of the proper use and possible adverse effects of benzoyl peroxide.  All of the patient's questions and concerns were addressed. Bactrim Pregnancy And Lactation Text: This medication is Pregnancy Category D and is known to cause fetal risk.  It is also excreted in breast milk. Olanzapine Counseling- I discussed with the patient the common side effects of olanzapine including but are not limited to: lack of energy, dry mouth, increased appetite, sleepiness, tremor, constipation, dizziness, changes in behavior, or restlessness.  Explained that teenagers are more likely to experience headaches, abdominal pain, pain in the arms or legs, tiredness, and sleepiness.  Serious side effects include but are not limited: increased risk of death in elderly patients who are confused, have memory loss, or dementia-related psychosis; hyperglycemia; increased cholesterol and triglycerides; and weight gain. Rifampin Pregnancy And Lactation Text: This medication is Pregnancy Category C and it isn't know if it is safe during pregnancy. It is also excreted in breast milk and should not be used if you are breast feeding. Acitretin Pregnancy And Lactation Text: This medication is Pregnancy Category X and should not be given to women who are pregnant or may become pregnant in the future. This medication is excreted in breast milk. Tazorac Counseling:  Patient advised that medication is irritating and drying.  Patient may need to apply sparingly and wash off after an hour before eventually leaving it on overnight.  The patient verbalized understanding of the proper use and possible adverse effects of tazorac.  All of the patient's questions and concerns were addressed. Odomzo Counseling- I discussed with the patient the risks of Odomzo including but not limited to nausea, vomiting, diarrhea, constipation, weight loss, changes in the sense of taste, decreased appetite, muscle spasms, and hair loss.  The patient verbalized understanding of the proper use and possible adverse effects of Odomzo.  All of the patient's questions and concerns were addressed. Sarecycline Counseling: Patient advised regarding possible photosensitivity and discoloration of the teeth, skin, lips, tongue and gums.  Patient instructed to avoid sunlight, if possible.  When exposed to sunlight, patients should wear protective clothing, sunglasses, and sunscreen.  The patient was instructed to call the office immediately if the following severe adverse effects occur:  hearing changes, easy bruising/bleeding, severe headache, or vision changes.  The patient verbalized understanding of the proper use and possible adverse effects of sarecycline.  All of the patient's questions and concerns were addressed. Tremfya Counseling: I discussed with the patient the risks of guselkumab including but not limited to immunosuppression, serious infections, and drug reactions.  The patient understands that monitoring is required including a PPD at baseline and must alert us or the primary physician if symptoms of infection or other concerning signs are noted. Thalidomide Counseling: I discussed with the patient the risks of thalidomide including but not limited to birth defects, anxiety, weakness, chest pain, dizziness, cough and severe allergy. Olumiant Pregnancy And Lactation Text: Based on animal studies, Olumiant may cause embryo-fetal harm when administered to pregnant women.  The medication should not be used in pregnancy.  Breastfeeding is not recommended during treatment. Oral Minoxidil Pregnancy And Lactation Text: This medication should only be used when clearly needed if you are pregnant, attempting to become pregnant or breast feeding. Erythromycin Pregnancy And Lactation Text: This medication is Pregnancy Category B and is considered safe during pregnancy. It is also excreted in breast milk. Cephalexin Counseling: I counseled the patient regarding use of cephalexin as an antibiotic for prophylactic and/or therapeutic purposes. Cephalexin (commonly prescribed under brand name Keflex) is a cephalosporin antibiotic which is active against numerous classes of bacteria, including most skin bacteria. Side effects may include nausea, diarrhea, gastrointestinal upset, rash, hives, yeast infections, and in rare cases, hepatitis, kidney disease, seizures, fever, confusion, neurologic symptoms, and others. Patients with severe allergies to penicillin medications are cautioned that there is about a 10% incidence of cross-reactivity with cephalosporins. When possible, patients with penicillin allergies should use alternatives to cephalosporins for antibiotic therapy. Imiquimod Counseling:  I discussed with the patient the risks of imiquimod including but not limited to erythema, scaling, itching, weeping, crusting, and pain.  Patient understands that the inflammatory response to imiquimod is variable from person to person and was educated regarded proper titration schedule.  If flu-like symptoms develop, patient knows to discontinue the medication and contact us. Winlevi Counseling:  I discussed with the patient the risks of topical clascoterone including but not limited to erythema, scaling, itching, and stinging. Patient voiced their understanding. Adbry Counseling: I discussed with the patient the risks of tralokinumab including but not limited to eye infection and irritation, cold sores, injection site reactions, worsening of asthma, allergic reactions and increased risk of parasitic infection.  Live vaccines should be avoided while taking tralokinumab. The patient understands that monitoring is required and they must alert us or the primary physician if symptoms of infection or other concerning signs are noted. Cyclosporine Counseling:  I discussed with the patient the risks of cyclosporine including but not limited to hypertension, gingival hyperplasia,myelosuppression, immunosuppression, liver damage, kidney damage, neurotoxicity, lymphoma, and serious infections. The patient understands that monitoring is required including baseline blood pressure, CBC, CMP, lipid panel and uric acid, and then 1-2 times monthly CMP and blood pressure. Clofazimine Counseling:  I discussed with the patient the risks of clofazimine including but not limited to skin and eye pigmentation, liver damage, nausea/vomiting, gastrointestinal bleeding and allergy. Dupixent Pregnancy And Lactation Text: This medication likely crosses the placenta but the risk for the fetus is uncertain. This medication is excreted in breast milk. Enbrel Counseling:  I discussed with the patient the risks of etanercept including but not limited to myelosuppression, immunosuppression, autoimmune hepatitis, demyelinating diseases, lymphoma, and infections.  The patient understands that monitoring is required including a PPD at baseline and must alert us or the primary physician if symptoms of infection or other concerning signs are noted. Spironolactone Counseling: Patient advised regarding risks of diarrhea, abdominal pain, hyperkalemia, birth defects (for female patients), liver toxicity and renal toxicity. The patient may need blood work to monitor liver and kidney function and potassium levels while on therapy. The patient verbalized understanding of the proper use and possible adverse effects of spironolactone.  All of the patient's questions and concerns were addressed. Benzoyl Peroxide Pregnancy And Lactation Text: This medication is Pregnancy Category C. It is unknown if benzoyl peroxide is excreted in breast milk. Rituxan Pregnancy And Lactation Text: This medication is Pregnancy Category C and it isn't know if it is safe during pregnancy. It is unknown if this medication is excreted in breast milk but similar antibodies are known to be excreted. Hydroxychloroquine Counseling:  I discussed with the patient that a baseline ophthalmologic exam is needed at the start of therapy and every year thereafter while on therapy. A CBC may also be warranted for monitoring.  The side effects of this medication were discussed with the patient, including but not limited to agranulocytosis, aplastic anemia, seizures, rashes, retinopathy, and liver toxicity. Patient instructed to call the office should any adverse effect occur.  The patient verbalized understanding of the proper use and possible adverse effects of Plaquenil.  All the patient's questions and concerns were addressed. Bexarotene Counseling:  I discussed with the patient the risks of bexarotene including but not limited to hair loss, dry lips/skin/eyes, liver abnormalities, hyperlipidemia, pancreatitis, depression/suicidal ideation, photosensitivity, drug rash/allergic reactions, hypothyroidism, anemia, leukopenia, infection, cataracts, and teratogenicity.  Patient understands that they will need regular blood tests to check lipid profile, liver function tests, white blood cell count, thyroid function tests and pregnancy test if applicable. Protopic Counseling: Patient may experience a mild burning sensation during topical application. Protopic is not approved in children less than 2 years of age. There have been case reports of hematologic and skin malignancies in patients using topical calcineurin inhibitors although causality is questionable. Tazorac Pregnancy And Lactation Text: This medication is not safe during pregnancy. It is unknown if this medication is excreted in breast milk. Taltz Counseling: I discussed with the patient the risks of ixekizumab including but not limited to immunosuppression, serious infections, worsening of inflammatory bowel disease and drug reactions.  The patient understands that monitoring is required including a PPD at baseline and must alert us or the primary physician if symptoms of infection or other concerning signs are noted. Topical Clindamycin Counseling: Patient counseled that this medication may cause skin irritation or allergic reactions.  In the event of skin irritation, the patient was advised to reduce the amount of the drug applied or use it less frequently.   The patient verbalized understanding of the proper use and possible adverse effects of clindamycin.  All of the patient's questions and concerns were addressed. Metronidazole Counseling:  I discussed with the patient the risks of metronidazole including but not limited to seizures, nausea/vomiting, a metallic taste in the mouth, nausea/vomiting and severe allergy. Otezla Counseling: The side effects of Otezla were discussed with the patient, including but not limited to worsening or new depression, weight loss, diarrhea, nausea, upper respiratory tract infection, and headache. Patient instructed to call the office should any adverse effect occur.  The patient verbalized understanding of the proper use and possible adverse effects of Otezla.  All the patient's questions and concerns were addressed. Cephalexin Pregnancy And Lactation Text: This medication is Pregnancy Category B and considered safe during pregnancy.  It is also excreted in breast milk but can be used safely for shorter doses. Rinvoq Counseling: I discussed with the patient the risks of Rinvoq therapy including but not limited to upper respiratory tract infections, shingles, cold sores, bronchitis, nausea, cough, fever, acne, and headache. Live vaccines should be avoided.  This medication has been linked to serious infections; higher rate of mortality; malignancy and lymphoproliferative disorders; major adverse cardiovascular events; thrombosis; thrombocytopenia, anemia, and neutropenia; lipid elevations; liver enzyme elevations; and gastrointestinal perforations. Adbry Pregnancy And Lactation Text: It is unknown if this medication will adversely affect pregnancy or breast feeding. Ketoconazole Counseling:   Patient counseled regarding improving absorption with orange juice.  Adverse effects include but are not limited to breast enlargement, headache, diarrhea, nausea, upset stomach, liver function test abnormalities, taste disturbance, and stomach pain.  There is a rare possibility of liver failure that can occur when taking ketoconazole. The patient understands that monitoring of LFTs may be required, especially at baseline. The patient verbalized understanding of the proper use and possible adverse effects of ketoconazole.  All of the patient's questions and concerns were addressed. Drysol Counseling:  I discussed with the patient the risks of drysol/aluminum chloride including but not limited to skin rash, itching, irritation, burning. Doxepin Pregnancy And Lactation Text: This medication is Pregnancy Category C and it isn't known if it is safe during pregnancy. It is also excreted in breast milk and breast feeding isn't recommended. Winlevi Pregnancy And Lactation Text: This medication is considered safe during pregnancy and breastfeeding. Colchicine Counseling:  Patient counseled regarding adverse effects including but not limited to stomach upset (nausea, vomiting, stomach pain, or diarrhea).  Patient instructed to limit alcohol consumption while taking this medication.  Colchicine may reduce blood counts especially with prolonged use.  The patient understands that monitoring of kidney function and blood counts may be required, especially at baseline. The patient verbalized understanding of the proper use and possible adverse effects of colchicine.  All of the patient's questions and concerns were addressed. Methotrexate Counseling:  Patient counseled regarding adverse effects of methotrexate including but not limited to nausea, vomiting, abnormalities in liver function tests. Patients may develop mouth sores, rash, diarrhea, and abnormalities in blood counts. The patient understands that monitoring is required including LFT's and blood counts.  There is a rare possibility of scarring of the liver and lung problems that can occur when taking methotrexate. Persistent nausea, loss of appetite, pale stools, dark urine, cough, and shortness of breath should be reported immediately. Patient advised to discontinue methotrexate treatment at least three months before attempting to become pregnant.  I discussed the need for folate supplements while taking methotrexate.  These supplements can decrease side effects during methotrexate treatment. The patient verbalized understanding of the proper use and possible adverse effects of methotrexate.  All of the patient's questions and concerns were addressed. Carac Counseling:  I discussed with the patient the risks of Carac including but not limited to erythema, scaling, itching, weeping, crusting, and pain. Tetracycline Counseling: Patient counseled regarding possible photosensitivity and increased risk for sunburn.  Patient instructed to avoid sunlight, if possible.  When exposed to sunlight, patients should wear protective clothing, sunglasses, and sunscreen.  The patient was instructed to call the office immediately if the following severe adverse effects occur:  hearing changes, easy bruising/bleeding, severe headache, or vision changes.  The patient verbalized understanding of the proper use and possible adverse effects of tetracycline.  All of the patient's questions and concerns were addressed. Patient understands to avoid pregnancy while on therapy due to potential birth defects. Dutasteride Male Counseling: Dustasteride Counseling:  I discussed with the patient the risks of use of dutasteride including but not limited to decreased libido, decreased ejaculate volume, and gynecomastia. Women who can become pregnant should not handle medication.  All of the patient's questions and concerns were addressed. Hydroxychloroquine Pregnancy And Lactation Text: This medication has been shown to cause fetal harm but it isn't assigned a Pregnancy Risk Category. There are small amounts excreted in breast milk. Hydroxyzine Counseling: Patient advised that the medication is sedating and not to drive a car after taking this medication.  Patient informed of potential adverse effects including but not limited to dry mouth, urinary retention, and blurry vision.  The patient verbalized understanding of the proper use and possible adverse effects of hydroxyzine.  All of the patient's questions and concerns were addressed. Tranexamic Acid Counseling:  Patient advised of the small risk of bleeding problems with tranexamic acid. They were also instructed to call if they developed any nausea, vomiting or diarrhea. All of the patient's questions and concerns were addressed. Spironolactone Pregnancy And Lactation Text: This medication can cause feminization of the male fetus and should be avoided during pregnancy. The active metabolite is also found in breast milk. Bexarotene Pregnancy And Lactation Text: This medication is Pregnancy Category X and should not be given to women who are pregnant or may become pregnant. This medication should not be used if you are breast feeding. Protopic Pregnancy And Lactation Text: This medication is Pregnancy Category C. It is unknown if this medication is excreted in breast milk when applied topically. Minoxidil Counseling: Minoxidil is a topical medication which can increase blood flow where it is applied. It is uncertain how this medication increases hair growth. Side effects are uncommon and include stinging and allergic reactions. Siliq Counseling:  I discussed with the patient the risks of Siliq including but not limited to new or worsening depression, suicidal thoughts and behavior, immunosuppression, malignancy, posterior leukoencephalopathy syndrome, and serious infections.  The patient understands that monitoring is required including a PPD at baseline and must alert us or the primary physician if symptoms of infection or other concerning signs are noted. There is also a special program designed to monitor depression which is required with Siliq. Metronidazole Pregnancy And Lactation Text: This medication is Pregnancy Category B and considered safe during pregnancy.  It is also excreted in breast milk. Isotretinoin Counseling: Patient should get monthly blood tests, not donate blood, not drive at night if vision affected, not share medication, and not undergo elective surgery for 6 months after tx completed. Side effects reviewed, pt to contact office should one occur. Ketoconazole Pregnancy And Lactation Text: This medication is Pregnancy Category C and it isn't know if it is safe during pregnancy. It is also excreted in breast milk and breast feeding isn't recommended. Rhofade Counseling: Rhofade is a topical medication which can decrease superficial blood flow where applied. Side effects are uncommon and include stinging, redness and allergic reactions. Otezla Pregnancy And Lactation Text: This medication is Pregnancy Category C and it isn't known if it is safe during pregnancy. It is unknown if it is excreted in breast milk. Rinvoq Pregnancy And Lactation Text: Based on animal studies, Rinvoq may cause embryo-fetal harm when administered to pregnant women.  The medication should not be used in pregnancy.  Breastfeeding is not recommended during treatment and for 6 days after the last dose. Clindamycin Counseling: I counseled the patient regarding use of clindamycin as an antibiotic for prophylactic and/or therapeutic purposes. Clindamycin is active against numerous classes of bacteria, including skin bacteria. Side effects may include nausea, diarrhea, gastrointestinal upset, rash, hives, yeast infections, and in rare cases, colitis. Xolair Counseling:  Patient informed of potential adverse effects including but not limited to fever, muscle aches, rash and allergic reactions.  The patient verbalized understanding of the proper use and possible adverse effects of Xolair.  All of the patient's questions and concerns were addressed. Zyclara Counseling:  I discussed with the patient the risks of imiquimod including but not limited to erythema, scaling, itching, weeping, crusting, and pain.  Patient understands that the inflammatory response to imiquimod is variable from person to person and was educated regarded proper titration schedule.  If flu-like symptoms develop, patient knows to discontinue the medication and contact us. Bimzelx Counseling:  I discussed with the patient the risks of Bimzelx including but not limited to depression, immunosuppression, allergic reactions and infections.  The patient understands that monitoring is required including a PPD at baseline and must alert us or the primary physician if symptoms of infection or other concerning signs are noted. Bimzelx Pregnancy And Lactation Text: This medication crosses the placenta and the safety is uncertain during pregnancy. It is unknown if this medication is present in breast milk. Tranexamic Acid Pregnancy And Lactation Text: It is unknown if this medication is safe during pregnancy or breast feeding. Dutasteride Female Counseling: Dutasteride Counseling:  I discussed with the patient the risks of use of dutasteride including but not limited to decreased libido and sexual dysfunction. Explained the teratogenic nature of the medication and stressed the importance of not getting pregnant during treatment. All of the patient's questions and concerns were addressed. Xolair Pregnancy And Lactation Text: This medication is Pregnancy Category B and is considered safe during pregnancy. This medication is excreted in breast milk. Humira Counseling:  I discussed with the patient the risks of adalimumab including but not limited to myelosuppression, immunosuppression, autoimmune hepatitis, demyelinating diseases, lymphoma, and serious infections.  The patient understands that monitoring is required including a PPD at baseline and must alert us or the primary physician if symptoms of infection or other concerning signs are noted. Sotyktu Counseling:  I discussed the most common side effects of Sotyktu including: common cold, sore throat, sinus infections, cold sores, canker sores, folliculitis, and acne.  I also discussed more serious side effects of Sotyktu including but not limited to: serious allergic reactions; increased risk for infections such as TB; cancers such as lymphomas; rhabdomyolysis and elevated CPK; and elevated triglycerides and liver enzymes.  Clindamycin Pregnancy And Lactation Text: This medication can be used in pregnancy if certain situations. Clindamycin is also present in breast milk. Oxybutynin Counseling:  I discussed with the patient the risks of oxybutynin including but not limited to skin rash, drowsiness, dry mouth, difficulty urinating, and blurred vision. Hydroxyzine Pregnancy And Lactation Text: This medication is not safe during pregnancy and should not be taken. It is also excreted in breast milk and breast feeding isn't recommended. Elidel Counseling: Patient may experience a mild burning sensation during topical application. Elidel is not approved in children less than 2 years of age. There have been case reports of hematologic and skin malignancies in patients using topical calcineurin inhibitors although causality is questionable. Low Dose Naltrexone Counseling- I discussed with the patient the potential risks and side effects of low dose naltrexone including but not limited to: more vivid dreams, headaches, nausea, vomiting, abdominal pain, fatigue, dizziness, and anxiety. Methotrexate Pregnancy And Lactation Text: This medication is Pregnancy Category X and is known to cause fetal harm. This medication is excreted in breast milk.

## (undated) DEVICE — CHLORAPREP HI-LITE 26ML ORANGE

## (undated) DEVICE — ENDOPATH XCEL BLADELESS TROCARS WITH STABILITY SLEEVES: Brand: ENDOPATH XCEL

## (undated) DEVICE — INTENDED FOR TISSUE SEPARATION, AND OTHER PROCEDURES THAT REQUIRE A SHARP SURGICAL BLADE TO PUNCTURE OR CUT.: Brand: BARD-PARKER SAFETY BLADES SIZE 11, STERILE

## (undated) DEVICE — RETRIEVAL BALLOON CATHETER: Brand: EXTRACTOR™ PRO RX

## (undated) DEVICE — ELECTRODE LAP J HOOK E-Z CLEAN 33CM-0021

## (undated) DEVICE — GLOVE SRG BIOGEL ORTHOPEDIC 7.5

## (undated) DEVICE — PACK PBDS LAP CHOLE RF

## (undated) DEVICE — SPHINCTEROTOME: Brand: DREAMTOME™ RX 44

## (undated) DEVICE — SUT VICRYL 0 UR-6 27 IN J603H

## (undated) DEVICE — Device

## (undated) DEVICE — 3000CC GUARDIAN II: Brand: GUARDIAN

## (undated) DEVICE — NEEDLE 25G X 1 1/2

## (undated) DEVICE — LIGAMAX 5 MM ENDOSCOPIC MULTIPLE CLIP APPLIER: Brand: LIGAMAX

## (undated) DEVICE — PENCIL ELECTROSURG E-Z CLEAN -0035H

## (undated) DEVICE — SURGICEL 4 X 8

## (undated) DEVICE — SCD SEQUENTIAL COMPRESSION COMFORT SLEEVE MEDIUM KNEE LENGTH: Brand: KENDALL SCD

## (undated) DEVICE — ADHESIVE SKN CLSR HISTOACRYL FLEX 0.5ML LF

## (undated) DEVICE — SUT MONOCRYL 4-0 PS-2 18 IN Y496G

## (undated) DEVICE — ENDOPOUCH RETRIEVER SPECIMEN RETRIEVAL BAGS: Brand: ENDOPOUCH RETRIEVER

## (undated) DEVICE — ENDOPATH XCEL UNIVERSAL TROCAR STABLILITY SLEEVES: Brand: ENDOPATH XCEL